# Patient Record
Sex: MALE | Race: BLACK OR AFRICAN AMERICAN | Employment: FULL TIME | URBAN - METROPOLITAN AREA
[De-identification: names, ages, dates, MRNs, and addresses within clinical notes are randomized per-mention and may not be internally consistent; named-entity substitution may affect disease eponyms.]

---

## 2017-07-21 ENCOUNTER — HOSPITAL ENCOUNTER (EMERGENCY)
Age: 27
Discharge: HOME | End: 2017-07-21
Attending: EMERGENCY MEDICINE
Payer: SELF-PAY

## 2017-07-21 VITALS
TEMPERATURE: 99.4 F | BODY MASS INDEX: 28.8 KG/M2 | RESPIRATION RATE: 16 BRPM | HEART RATE: 84 BPM | WEIGHT: 195 LBS | OXYGEN SATURATION: 97 % | DIASTOLIC BLOOD PRESSURE: 99 MMHG | SYSTOLIC BLOOD PRESSURE: 165 MMHG

## 2017-07-21 DIAGNOSIS — T14.8XXA MUSCLE STRAIN: Primary | ICD-10-CM

## 2017-07-21 DIAGNOSIS — M62.830 SPASM OF LUMBAR PARASPINOUS MUSCLE: ICD-10-CM

## 2017-07-21 PROCEDURE — 99281 EMR DPT VST MAYX REQ PHY/QHP: CPT

## 2017-07-21 RX ORDER — CYCLOBENZAPRINE HCL 5 MG
5 TABLET ORAL 2 TIMES DAILY
Qty: 8 TAB | Refills: 0 | Status: SHIPPED | OUTPATIENT
Start: 2017-07-21 | End: 2020-06-24 | Stop reason: CLARIF

## 2017-07-21 RX ORDER — HYDROCODONE BITARTRATE AND ACETAMINOPHEN 5; 325 MG/1; MG/1
1 TABLET ORAL
Qty: 8 TAB | Refills: 0 | Status: SHIPPED | OUTPATIENT
Start: 2017-07-21 | End: 2020-06-24 | Stop reason: CLARIF

## 2017-07-21 RX ORDER — CYCLOBENZAPRINE HCL 5 MG
10 TABLET ORAL 3 TIMES DAILY
Qty: 9 TAB | Refills: 0 | Status: SHIPPED | OUTPATIENT
Start: 2017-07-21 | End: 2017-07-21

## 2017-07-21 NOTE — ED PROVIDER NOTES
HPI Comments: 32yo male presenting to ED for evaluation of left lower back pain after heavy lifting while at work yesterday afternoon. Pt states he was lifting box and pain immediately followed. Denies blunt trauma or other injury. Reports pain in lower back, worse in left lower. Denies radiation to groin or legs. Pt denies history of HIV, unexplained weight loss, unexplained fevers, inability control bowel or bladder, prolonged steroid use, IV drug use, history of cancer. Pt has tried motrin without relief. Reports hx of similar pain after heavy lifting and has had relief with \"percs and muscle relaxant. \"  Pt has hx of HTN, not currently on BP meds. Denies headaches, dizziness, CP, SOB, neck pain, abdominal pain, NVD, urinary symptoms or any other symptoms at this time. Patient is a 32 y.o. male presenting with back pain. The history is provided by the patient. Back Pain    Pertinent negatives include no fever, no numbness, no headaches, no dysuria and no weakness. Past Medical History:   Diagnosis Date    Hypertension        No past surgical history on file. No family history on file. Social History     Social History    Marital status: SINGLE     Spouse name: N/A    Number of children: N/A    Years of education: N/A     Occupational History    Not on file. Social History Main Topics    Smoking status: Former Smoker    Smokeless tobacco: Not on file    Alcohol use Yes      Comment: socially    Drug use: No    Sexual activity: Not on file     Other Topics Concern    Not on file     Social History Narrative         ALLERGIES: Review of patient's allergies indicates no known allergies. Review of Systems   Constitutional: Negative for chills and fever. Cardiovascular: Negative for leg swelling. Genitourinary: Negative for dysuria, flank pain, frequency and hematuria. Musculoskeletal: Positive for back pain and myalgias. Negative for gait problem. Neurological: Negative for dizziness, weakness, numbness and headaches. Psychiatric/Behavioral: Negative. Vitals:    07/21/17 0800   BP: (!) 165/99   Pulse: 84   Resp: 16   Temp: 99.4 °F (37.4 °C)   SpO2: 97%   Weight: 88.5 kg (195 lb)            Physical Exam   Constitutional: He is oriented to person, place, and time. He appears well-developed and well-nourished. No distress. HENT:   Head: Normocephalic and atraumatic. Mouth/Throat: Oropharynx is clear and moist.   Eyes: Conjunctivae are normal.   Neck: Normal range of motion. Cardiovascular: Normal rate, regular rhythm, normal heart sounds and intact distal pulses. Pulmonary/Chest: Effort normal. No respiratory distress. He has no wheezes. He has no rales. Abdominal: Soft. He exhibits no distension. Musculoskeletal: Normal range of motion. Non tender to midline palpation of the cervical, thoracic, and lumbar spine. No step off or deformity. +increased tone appreciated to left parapsinal lumbar muscle, consistent with spasm   Neg SLR. LE reflexes symmetric and intact. Normal sensation, Normal gait. FROM BLE against resistance in flexion and extension with 5/5 strength. Non tender to bilateral hips/knees/ankles. Pulses intact and equal.       Neurological: He is alert and oriented to person, place, and time. He has normal reflexes. No cranial nerve deficit. Skin: Skin is warm. He is not diaphoretic. Nursing note and vitals reviewed. MDM  Number of Diagnoses or Management Options  Diagnosis management comments: 32yo male with lower back pain L>R after heavy lifting at work. BP mildly elevated, not currently on BP meds but will f/u with PCP for monitoring, understands importance. Remaining vitals normal. PE +lumbar lateral muscle tightness, pain consistent with spasms/strain. No back pain red flags, no midline tenderness, normal LE neuro negative exam, no trauma, afebrile. No indication for emergent imaging.   Rx for flexeril and FEW tabs of norco and f/u with [facility] and /or [facility],     ED Course       Procedures

## 2017-07-21 NOTE — LETTER
[address] [place] [address] Dr [facility] EM[facility] 
[address] [facility] [id number] [phone] Work/School Note Date: 7/21/2017 To Whom It May concern: 
 
Ciara Cordova was seen and treated today in the emergency room by the following provider(s): 
Attending Provider: Angelica Sierra MD 
Physician Assistant: Tona Hansen PA-C. Ciara Cordova return to work 7/23/17 Sincerely, Tona Hansen PA-C

## 2017-07-21 NOTE — DISCHARGE INSTRUCTIONS
Back Spasm: Care Instructions  Your Care Instructions  A back spasm is sudden tightness and pain in your back muscles. It may happen from overuse or an injury. Things like sleeping in an awkward way, bending, lifting, standing, or sitting can sometimes cause a spasm. But the cause isn't always clear. Home treatment includes using heat or ice, taking over-the-counter (OTC) pain medicines, and avoiding activities that may cause back pain. For a back spasm that doesn't get better with home care, your doctor may prescribe medicine. Treatments such as massage or manipulation may also help ease a back spasm. Your doctor may also suggest exercise or physical therapy to help improve strength and flexibility in your back muscles. In most cases, getting back to your normal activities is good for your back. Just make sure to avoid doing things that make your pain worse. Follow-up care is a key part of your treatment and safety. Be sure to make and go to all appointments, and call your doctor if you are having problems. It's also a good idea to know your test results and keep a list of the medicines you take. How can you care for yourself at home? Heat, ice, and medicines  · To relieve pain, use heat or ice (whichever feels better) on the affected area. ¨ Put a warm water bottle, a heating pad set on low, or a warm cloth on your back. Put a thin cloth between the heating pad and your skin. Do not go to sleep with a heating pad on your skin. ¨ Try ice or a cold pack on the area for 10 to 20 minutes at a time. Put a thin cloth between the ice and your skin. · Ask your doctor if you can take acetaminophen (such as Tylenol) or nonsteroidal anti-inflammatory drugs, such as ibuprofen or naproxen. Your doctor can prescribe stronger medicines if needed. Be safe with medicines. Read and follow all instructions on the label.   Body positions and posture  · Sit or lie in positions that are most comfortable for you and that reduce pain. Try one of these positions when you lie down:  ¨ Lie on your back with your knees bent and supported by large pillows. ¨ Lie on the floor with your legs on the seat of a sofa or chair. Nia [place] on your side with your knees and hips bent and a pillow between your legs. ¨ Lie on your stomach if it does not make pain worse. · Do not sit up in bed. Avoid soft couches and twisted positions. · Avoid bed rest after the first day of back pain. Bed rest can help relieve pain at first, but it delays healing. Continued rest without activity is usually not good for your back. · If you must sit for long periods of time, take breaks from sitting. Change positions every 30 minutes. Get up and walk around, or lie in a comfortable position. Activity  · Take short walks several times a day. You can start with 5 to 10 minutes, 3 or 4 times a day, and work up to longer walks. Walk on level surfaces and avoid hills and stairs until your back starts to feel better. · After your back spasm starts to feel better, try to stretch your muscles every day, especially before and after exercise and at bedtime. Regular stretching can help relax your muscles. · To prevent future back pain, do exercises to stretch and strengthen your back and stomach. Learn to use good posture, safe lifting techniques, and other ways to move to help you avoid back pain. When should you call for help? Call 911 anytime you think you may need emergency care. For example, call if:  · You are unable to move an arm or a leg at all. Call your doctor now or seek immediate medical care if:  · You have new or worse symptoms in your legs, belly, or buttocks. Symptoms may include:  ¨ Numbness or tingling. ¨ Weakness. ¨ Pain. · You lose bladder or bowel control. Watch closely for changes in your health, and be sure to contact your doctor if:  · You do not get better as expected. Where can you learn more?   Go to http://frantz-sivakumar.info/. Enter E232 in the search box to learn more about \"Back Spasm: Care Instructions. \"  Current as of: March 21, 2017  Content Version: 11.3  © 9751-0594 [employer]. Care instructions adapted under license by [employer] (which disclaims liability or warranty for this information). If you have questions about a medical condition or this instruction, always ask your healthcare professional. [place] [address] any warranty or liability for your use of this information. Back Strain: Care Instructions  Your Care Instructions    Back strain happens when you overstretch, or pull, a muscle in your back. You may hurt your back in an accident or when you exercise or lift something. Most back pain will get better with rest and time. You can take care of yourself at home to help your back heal.  Follow-up care is a key part of your treatment and safety. Be sure to make and go to all appointments, and call your doctor if you are having problems. It's also a good idea to know your test results and keep a list of the medicines you take. How can you care for yourself at home? · Try to stay as active as you can, but stop or reduce any activity that causes pain. · Put ice or a cold pack on the sore muscle for 10 to 20 minutes at a time to stop swelling. Try this every 1 to 2 hours for 3 days (when you are awake) or until the swelling goes down. Put a thin cloth between the ice pack and your skin. · After 2 or 3 days, apply a heating pad on low or a warm cloth to your back. Some doctors suggest that you go back and forth between hot and cold treatments. · Take pain medicines exactly as directed. ¨ If the doctor gave you a prescription medicine for pain, take it as prescribed. ¨ If you are not taking a prescription pain medicine, ask your doctor if you can take an over-the-counter medicine.   · Try sleeping on your side with a pillow between your legs. Or put a pillow under your knees when you lie on your back. These measures can ease pain in your lower back. · Return to your usual level of activity slowly. When should you call for help? Call 911 anytime you think you may need emergency care. For example, call if:  · You are unable to move a leg at all. Call your doctor now or seek immediate medical care if:  · You have new or worse symptoms in your legs, belly, or buttocks. Symptoms may include:  ¨ Numbness or tingling. ¨ Weakness. ¨ Pain. · You lose bladder or bowel control. Watch closely for changes in your health, and be sure to contact your doctor if you are not getting better as expected. Where can you learn more? Go to http://frantz-sivakumar.info/. Enter R398 in the search box to learn more about \"Back Strain: Care Instructions. \"  Current as of: March 21, 2017  Content Version: 11.3  © 8465-1777 [employer] [place]. Care instructions adapted under license by [employer] (which disclaims liability or warranty for this information). If you have questions about a medical condition or this instruction, always ask your healthcare professional. [address] any warranty or liability for your use of this information.

## 2017-07-27 ENCOUNTER — HOSPITAL ENCOUNTER (EMERGENCY)
Age: 27
Discharge: HOME | End: 2017-07-27
Attending: EMERGENCY MEDICINE
Payer: SELF-PAY

## 2017-07-27 VITALS
WEIGHT: 195 LBS | OXYGEN SATURATION: 97 % | DIASTOLIC BLOOD PRESSURE: 100 MMHG | SYSTOLIC BLOOD PRESSURE: 158 MMHG | HEART RATE: 63 BPM | TEMPERATURE: 98.8 F | RESPIRATION RATE: 20 BRPM | BODY MASS INDEX: 28.8 KG/M2

## 2017-07-27 DIAGNOSIS — B37.2 CANDIDAL INTERTRIGO: Primary | ICD-10-CM

## 2017-07-27 DIAGNOSIS — I10 ESSENTIAL HYPERTENSION: ICD-10-CM

## 2017-07-27 PROCEDURE — 99282 EMERGENCY DEPT VISIT SF MDM: CPT

## 2017-07-27 RX ORDER — TRIAMCINOLONE ACETONIDE 0.25 MG/G
CREAM TOPICAL 2 TIMES DAILY
Qty: 15 G | Refills: 0 | Status: SHIPPED | OUTPATIENT
Start: 2017-07-27 | End: 2020-06-24 | Stop reason: CLARIF

## 2017-07-27 RX ORDER — NYSTATIN 100000 U/G
CREAM TOPICAL 2 TIMES DAILY
Qty: 15 G | Refills: 0 | Status: SHIPPED | OUTPATIENT
Start: 2017-07-27 | End: 2020-06-24 | Stop reason: CLARIF

## 2017-07-27 RX ORDER — AMLODIPINE BESYLATE 10 MG/1
10 TABLET ORAL DAILY
Qty: 20 TAB | Refills: 0 | Status: SHIPPED | OUTPATIENT
Start: 2017-07-27 | End: 2017-08-16

## 2017-07-27 NOTE — DISCHARGE INSTRUCTIONS
It is very important that blood pressure medications are taken daily, and that you follow up with a primary care doctor for continued management of medications. Return to ED for any worsening symptoms such as severe headaches, fever, neck stiffness, weakness in the extremities, numbness, dizziness, confusion, difficulty speaking, chest pain, shortness or breath, or vision changes. Yeast Skin Infection: Care Instructions  Your Care Instructions    Yeast normally lives on your skin. Sometimes too much yeast can overgrow in certain areas of the skin and cause an infection. The infection causes red, scaly, moist patches on your skin that may itch. Common areas for skin yeast infections are skin folds under the breasts or belly area. The warm and moist areas in the skin folds can make it easier for yeast to overgrow. Yeast infections also can be found on other parts of the body such as the groin or armpits. You will probably get a cream or ointment that contains an antifungal medicine. Examples of these are miconazole and clotrimazole. You put it on your skin to treat the infection. Your doctor may give you a prescription for the cream or ointment. Or you may be able to buy it without a prescription at most drugstores. If the infection is severe, the doctor will prescribe antifungal pills. A yeast infection usually goes away after about a week of treatment. But it's important to use the medicine for as long as your doctor tells you to. Follow-up care is a key part of your treatment and safety. Be sure to make and go to all appointments, and call your doctor if you are having problems. It's also a good idea to know your test results and keep a list of the medicines you take. How can you care for yourself at home? · Be safe with medicines. Take your medicines exactly as prescribed. Call your doctor if you think you are having a problem with your medicine. · Keep your skin clean and dry.  Your doctor may suggest using powder that contains an antifungal medicine in the skin folds. · Wear loose clothing. When should you call for help? Call your doctor now or seek immediate medical care if:  · You have symptoms of infection, such as:  ¨ Increased pain, swelling, warmth, or redness. ¨ Red streaks leading from the area. ¨ Pus draining from the area. ¨ A fever. Watch closely for changes in your health, and be sure to contact your doctor if:  · You do not get better as expected. Where can you learn more? Go to http://frantz-sivakumar.info/. Enter O220 in the search box to learn more about \"Yeast Skin Infection: Care Instructions. \"  Current as of: November 3, 2016  Content Version: 11.3  © 3266-0718 [employer]. Care instructions adapted under license by [employer] (which disclaims liability or warranty for this information). If you have questions about a medical condition or this instruction, always ask your healthcare professional. [place] [address] any warranty or liability for your use of this information.

## 2017-07-27 NOTE — ED NOTES
Patient also reports uncontrolled hypertension, he admits to being non-compliant for 3 years due to not having insurance.   Patient denies any CP or SOB at this time

## 2017-07-27 NOTE — ED PROVIDER NOTES
HPI Comments: 32yo M c/o rash to groin x 5 months. Itching and irritated, burning sensation. Worse on left thigh. He has not tried any creams for it or medications. He also has high blood pressure and is unable to see a PCP without insurance. Requests refill of his blood pressure medications. Patient is a 32 y.o. male presenting with rash. Rash           Past Medical History:   Diagnosis Date    Hypertension        No past surgical history on file. No family history on file. Social History     Social History    Marital status: SINGLE     Spouse name: N/A    Number of children: N/A    Years of education: N/A     Occupational History    Not on file. Social History Main Topics    Smoking status: Former Smoker    Smokeless tobacco: Not on file    Alcohol use Yes      Comment: socially    Drug use: No    Sexual activity: Not on file     Other Topics Concern    Not on file     Social History Narrative         ALLERGIES: Review of patient's allergies indicates no known allergies. Review of Systems   Constitutional: Negative for fever. HENT: Negative for facial swelling. Eyes: Negative for visual disturbance. Respiratory: Negative for shortness of breath. Cardiovascular: Negative for chest pain. Gastrointestinal: Negative for abdominal pain. Genitourinary: Negative for dysuria. Musculoskeletal: Negative for neck pain. Skin: Positive for rash. Neurological: Negative for dizziness. Psychiatric/Behavioral: Negative for confusion. All other systems reviewed and are negative. Vitals:    07/27/17 0344   BP: (!) 158/100   Pulse: 63   Resp: 20   Temp: 98.8 °F (37.1 °C)   SpO2: 97%   Weight: 88.5 kg (195 lb)            Physical Exam   Constitutional: He appears well-developed and well-nourished. No distress. HENT:   Head: Normocephalic and atraumatic. Eyes: Conjunctivae are normal.   Neck: Normal range of motion. Neck supple. Cardiovascular: Normal rate. Pulmonary/Chest: Effort normal.   Abdominal: Soft. Musculoskeletal: Normal range of motion. Neurological: He is alert. Skin: Skin is warm and dry. Rash noted. He is not diaphoretic. Large patch of hyperpigmented, rough skin with irregular edges to the left medial proximal thigh    Psychiatric: He has a normal mood and affect. Nursing note and vitals reviewed. MDM  Number of Diagnoses or Management Options  Candidal intertrigo: new and does not require workup  Essential hypertension: established and worsening  Diagnosis management comments: Appears to be a candidal rash to the left proximal thigh. Treat with steroids and antifungal.  Blood pressure is elevated today. Recommend low sodium diet and follow up with primary care physician. Return to ER for chest pain or shortness of breath. Referred to free clinic. Discussed treatment plan, return precautions, symptomatic relief, and expected time to improvement. All questions answered. Patient is stable for discharge and outpatient management. ED Course       Procedures      Diagnosis:   1. Candidal intertrigo    2. Essential hypertension          Disposition: home    Follow-up Information     Follow up With Details Comments [address] [facility] Schedule an appointment as soon as possible for a visit  [address] [facility] [address]  [address] to  [address] [address] [facility]    [facility] EMERGENCY DEPT  Immediately if symptoms worsen [address] [address]  [phone]          Discharge Medication List as of 7/27/2017  4:14 AM      START taking these medications    Details   nystatin (MYCOSTATIN) topical cream Apply  to affected area two (2) times a day., Print, Disp-15 g, R-0      triamcinolone acetonide (KENALOG) 0.025 % topical cream Apply  to affected area two (2) times a day.  use thin layer, Print, Disp-15 g, R-0 amLODIPine (NORVASC) 10 mg tablet Take 1 Tab by mouth daily for 20 days. , Print, Disp-20 Tab, R-0         CONTINUE these medications which have NOT CHANGED    Details   !! HYDROcodone-acetaminophen (NORCO) 5-325 mg per tablet Take 1 Tab by mouth every eight (8) hours as needed for Pain. Max Daily Amount: 3 Tabs., Print, Disp-8 Tab, R-0      !! cyclobenzaprine (FLEXERIL) 5 mg tablet Take 1 Tab by mouth two (2) times a day., Print, Disp-8 Tab, R-0      oxyCODONE-acetaminophen (PERCOCET) 5-325 mg per tablet Take 1 tablet every 4-6 hours as needed for pain control. If you were instructed to try over the counter ibuprofen or tylenol, only take the percocet for pain not controlled with the over the counter medication. , Print, Disp-6 Tab, R-0      !! cyclobenzaprine (FLEXERIL) 10 mg tablet Take 0.5 Tabs by mouth three (3) times daily as needed for Muscle Spasm(s). , Print, Disp-10 Tab, R-0      ibuprofen (MOTRIN) 600 mg tablet Take 1 Tab by mouth every six (6) hours as needed for Pain., Print, Disp-20 Tab, R-0      !! HYDROcodone-acetaminophen (NORCO) 5-325 mg per tablet Take 1 Tab by mouth every four (4) hours as needed for Pain. Max Daily Amount: 6 Tabs., Print, Disp-12 Tab, R-0      methocarbamol (ROBAXIN) 500 mg tablet Take 1 Tab by mouth four (4) times daily. , Print, Disp-20 Tab, R-0       !! - Potential duplicate medications found. Please discuss with provider.         Lavell Mcbride PA-C

## 2017-12-26 ENCOUNTER — APPOINTMENT (OUTPATIENT)
Dept: GENERAL RADIOLOGY | Age: 27
End: 2017-12-26
Attending: PHYSICIAN ASSISTANT
Payer: SELF-PAY

## 2017-12-26 ENCOUNTER — HOSPITAL ENCOUNTER (EMERGENCY)
Age: 27
Discharge: HOME | End: 2017-12-26
Attending: EMERGENCY MEDICINE
Payer: SELF-PAY

## 2017-12-26 VITALS
HEIGHT: 69 IN | SYSTOLIC BLOOD PRESSURE: 142 MMHG | TEMPERATURE: 97.1 F | OXYGEN SATURATION: 99 % | BODY MASS INDEX: 28.88 KG/M2 | WEIGHT: 195 LBS | DIASTOLIC BLOOD PRESSURE: 92 MMHG | RESPIRATION RATE: 16 BRPM | HEART RATE: 68 BPM

## 2017-12-26 DIAGNOSIS — F19.10 POLYSUBSTANCE ABUSE (HCC): ICD-10-CM

## 2017-12-26 DIAGNOSIS — K59.00 CONSTIPATION, UNSPECIFIED CONSTIPATION TYPE: Primary | ICD-10-CM

## 2017-12-26 LAB
ALBUMIN SERPL-MCNC: 3.6 G/DL (ref 3.4–5)
ALBUMIN/GLOB SERPL: 0.9 {RATIO} (ref 0.8–1.7)
ALP SERPL-CCNC: 84 U/L (ref 45–117)
ALT SERPL-CCNC: 23 U/L (ref 16–61)
AMPHET UR QL SCN: NEGATIVE
ANION GAP SERPL CALC-SCNC: 8 MMOL/L (ref 3–18)
APPEARANCE UR: CLEAR
AST SERPL-CCNC: 23 U/L (ref 15–37)
BARBITURATES UR QL SCN: NEGATIVE
BASOPHILS # BLD: 0 K/UL (ref 0–0.06)
BASOPHILS NFR BLD: 0 % (ref 0–2)
BENZODIAZ UR QL: NEGATIVE
BILIRUB DIRECT SERPL-MCNC: <0.1 MG/DL (ref 0–0.2)
BILIRUB SERPL-MCNC: 0.3 MG/DL (ref 0.2–1)
BILIRUB UR QL: NEGATIVE
BUN SERPL-MCNC: 10 MG/DL (ref 7–18)
BUN/CREAT SERPL: 8 (ref 12–20)
CALCIUM SERPL-MCNC: 8.6 MG/DL (ref 8.5–10.1)
CANNABINOIDS UR QL SCN: NEGATIVE
CHLORIDE SERPL-SCNC: 104 MMOL/L (ref 100–108)
CO2 SERPL-SCNC: 29 MMOL/L (ref 21–32)
COCAINE UR QL SCN: POSITIVE
COLOR UR: YELLOW
CREAT SERPL-MCNC: 1.31 MG/DL (ref 0.6–1.3)
DIFFERENTIAL METHOD BLD: ABNORMAL
EOSINOPHIL # BLD: 0.2 K/UL (ref 0–0.4)
EOSINOPHIL NFR BLD: 3 % (ref 0–5)
ERYTHROCYTE [DISTWIDTH] IN BLOOD BY AUTOMATED COUNT: 11.8 % (ref 11.6–14.5)
GLOBULIN SER CALC-MCNC: 4 G/DL (ref 2–4)
GLUCOSE SERPL-MCNC: 136 MG/DL (ref 74–99)
GLUCOSE UR STRIP.AUTO-MCNC: NEGATIVE MG/DL
HCT VFR BLD AUTO: 39.3 % (ref 36–48)
HDSCOM,HDSCOM: ABNORMAL
HGB BLD-MCNC: 13.2 G/DL (ref 13–16)
HGB UR QL STRIP: NEGATIVE
KETONES UR QL STRIP.AUTO: NEGATIVE MG/DL
LACTATE BLD-SCNC: 1.7 MMOL/L (ref 0.4–2)
LEUKOCYTE ESTERASE UR QL STRIP.AUTO: NEGATIVE
LIPASE SERPL-CCNC: 235 U/L (ref 73–393)
LYMPHOCYTES # BLD: 2.3 K/UL (ref 0.9–3.6)
LYMPHOCYTES NFR BLD: 40 % (ref 21–52)
MCH RBC QN AUTO: 30 PG (ref 24–34)
MCHC RBC AUTO-ENTMCNC: 33.6 G/DL (ref 31–37)
MCV RBC AUTO: 89.3 FL (ref 74–97)
METHADONE UR QL: NEGATIVE
MONOCYTES # BLD: 0.5 K/UL (ref 0.05–1.2)
MONOCYTES NFR BLD: 9 % (ref 3–10)
NEUTS SEG # BLD: 2.8 K/UL (ref 1.8–8)
NEUTS SEG NFR BLD: 48 % (ref 40–73)
NITRITE UR QL STRIP.AUTO: NEGATIVE
OPIATES UR QL: POSITIVE
PCP UR QL: NEGATIVE
PH UR STRIP: 5.5 [PH] (ref 5–8)
PLATELET # BLD AUTO: 244 K/UL (ref 135–420)
PMV BLD AUTO: 10.5 FL (ref 9.2–11.8)
POTASSIUM SERPL-SCNC: 3.7 MMOL/L (ref 3.5–5.5)
PROT SERPL-MCNC: 7.6 G/DL (ref 6.4–8.2)
PROT UR STRIP-MCNC: NEGATIVE MG/DL
RBC # BLD AUTO: 4.4 M/UL (ref 4.7–5.5)
SODIUM SERPL-SCNC: 141 MMOL/L (ref 136–145)
SP GR UR REFRACTOMETRY: 1.01 (ref 1–1.03)
UROBILINOGEN UR QL STRIP.AUTO: 1 EU/DL (ref 0.2–1)
WBC # BLD AUTO: 5.7 K/UL (ref 4.6–13.2)

## 2017-12-26 PROCEDURE — 81003 URINALYSIS AUTO W/O SCOPE: CPT | Performed by: EMERGENCY MEDICINE

## 2017-12-26 PROCEDURE — 85025 COMPLETE CBC W/AUTO DIFF WBC: CPT | Performed by: PHYSICIAN ASSISTANT

## 2017-12-26 PROCEDURE — 96360 HYDRATION IV INFUSION INIT: CPT

## 2017-12-26 PROCEDURE — 80307 DRUG TEST PRSMV CHEM ANLYZR: CPT | Performed by: PHYSICIAN ASSISTANT

## 2017-12-26 PROCEDURE — 83605 ASSAY OF LACTIC ACID: CPT

## 2017-12-26 PROCEDURE — 80048 BASIC METABOLIC PNL TOTAL CA: CPT | Performed by: PHYSICIAN ASSISTANT

## 2017-12-26 PROCEDURE — 74020 XR ABD (AP AND ERECT OR DECUB): CPT

## 2017-12-26 PROCEDURE — 80076 HEPATIC FUNCTION PANEL: CPT | Performed by: PHYSICIAN ASSISTANT

## 2017-12-26 PROCEDURE — 99284 EMERGENCY DEPT VISIT MOD MDM: CPT

## 2017-12-26 PROCEDURE — 83690 ASSAY OF LIPASE: CPT | Performed by: PHYSICIAN ASSISTANT

## 2017-12-26 PROCEDURE — 74011250636 HC RX REV CODE- 250/636: Performed by: PHYSICIAN ASSISTANT

## 2017-12-26 RX ORDER — MAGNESIUM CITRATE
SOLUTION, ORAL ORAL
Qty: 1 BOTTLE | Refills: 0 | Status: SHIPPED | OUTPATIENT
Start: 2017-12-26 | End: 2020-06-24 | Stop reason: CLARIF

## 2017-12-26 RX ORDER — IPRATROPIUM BROMIDE AND ALBUTEROL SULFATE 2.5; .5 MG/3ML; MG/3ML
3 SOLUTION RESPIRATORY (INHALATION)
Status: DISCONTINUED | OUTPATIENT
Start: 2017-12-26 | End: 2017-12-26

## 2017-12-26 RX ORDER — BISACODYL 5 MG
TABLET, DELAYED RELEASE (ENTERIC COATED) ORAL
Qty: 20 TAB | Refills: 0 | Status: SHIPPED | OUTPATIENT
Start: 2017-12-26 | End: 2020-06-24 | Stop reason: CLARIF

## 2017-12-26 RX ADMIN — SODIUM CHLORIDE 1000 ML: 900 INJECTION, SOLUTION INTRAVENOUS at 07:42

## 2017-12-26 NOTE — DISCHARGE INSTRUCTIONS
Misuse of Multiple Drugs: Care Instructions  Your Care Instructions    You have had treatment to help your body get rid of a combination of any of these drugs:  · Prescription medicines  · Over-the-counter medicines  · Alcohol  · Illegal drugs  Taking some drugs together may cause a bad reaction. They can have unexpected or stronger effects on your body and mind. For example, benzodiazepines (such as alprazolam and lorazepam) and alcohol both depress the nervous system. Taken together, each one is stronger than when it is taken by itself. You are getting better, but it takes time for the drugs to leave your body. It may take up to 2 weeks for your mind to clear and your mood to improve. Depending on the drugs you took, the doctor might have:  · Watched your symptoms or done tests to find out what drugs were in your body. · Treated you to control your breathing, blood pressure, and heart rate. · Tried to remove the drugs from your body by pumping your stomach or giving you a substance by mouth that absorbs chemicals. · Given you a substance that neutralizes chemicals (antidote). · Given you oxygen to help you breathe. · Given you fluids. The doctor also watched you carefully to make sure you were recovering safely. Follow-up care is a key part of your treatment and safety. Be sure to make and go to all appointments, and call your doctor if you are having problems. It's also a good idea to know your test results and keep a list of the medicines you take. How can you care for yourself at home? · When you take substances like alcohol and some drugs regularly, your body gets used to them. This is called dependency. If you are dependent on them, you may have withdrawal symptoms when you stop taking them. These symptoms may include trembling, feeling restless, and sweating. To help get past these:  ¨ Get plenty of rest.  ¨ Drink lots of fluids. ¨ Stay active. ¨ Eat a healthy diet.   · If you had a tube in your throat to help you breathe, you may have a sore throat or hoarseness that can last a few days. Drinking fluids may help soothe your throat. Get help to stop using drugs. Talk to your doctor about drug and alcohol treatment programs. When should you call for help? Call 911 anytime you think you may need emergency care. For example, call if:  ? · You feel you cannot stop from hurting yourself or someone else. ?Call your doctor now or seek immediate medical care if:  ? · You have new or worse symptoms of withdrawal, such as trembling, feeling restless, and sweating, that you can't manage at home. ? Watch closely for changes in your health, and be sure to contact your doctor if:  ? · You do not get better as expected. ? · You need help finding the right place to get help with drug or alcohol problems. Where can you learn more? Go to http://frantz-sivakumar.info/. Enter B109 in the search box to learn more about \"Misuse of Multiple Drugs: Care Instructions. \"  Current as of: November 3, 2016  Content Version: 11.4  © 8307-7154 [employer]. Care instructions adapted under license by [employer] (which disclaims liability or warranty for this information). If you have questions about a medical condition or this instruction, always ask your healthcare professional. [place] [address] any warranty or liability for your use of this information. Learning About Drug Misuse  What is drug misuse? Drug misuse means using drugs in a way that harms you or causes you to harm others. Your doctor may call it substance use disorder or drug abuse. It's possible to misuse almost any type of drug, including illegal drugs, prescription drugs, and over-the-counter drugs. An overdose happens when a person takes more than the normal or recommended amount of a drug. An overdose can result in harmful symptoms or death. Could you have a problem?   If there's a chance you may be misusing drugs, it's important to find out. So ask yourself a few questions. · Do you spend a lot of time thinking about your medicine or other drug-getting it and using it? Has that taken the place of other things you used to enjoy? · Have you had problems with your family or friends, or at work, because of your use? · Do you use drugs even when you've told yourself you won't? Do you think you might have a problem? If you do, then you've just taken an important first step. Many people have overcome this problem. And most of them started by reaching out to others, like caring friends or family, their doctor, or a support group. How is drug misuse treated? If you think you may have a problem with drugs, talk to your doctor. You and your doctor can decide whether you have a problem and what type of treatment might help you. You may need to stay in a hospital at first, so that you can be treated for withdrawal symptoms. One of the goals of treatment is helping you get used to life without the drug. Counseling can help you prepare for people or situations that might tempt you to start using again. You can practice these skills through one-on-one counseling, family therapy, or group therapy. Therapy may be part of inpatient treatment, where you stay in a treatment center. Or it may be part of outpatient treatment, where you can fit your therapy around your job or other responsibilities. Another goal of treatment is getting ongoing support for your drug-free life. Many people find support by going to meetings like Narcotics Anonymous or [employer]. This type of support can help you feel less alone and more motivated to stay drug-free. You might talk to your doctor or do an online search for local treatment programs. Or you might tell a friend or loved one that you need help. Follow-up care is a key part of your treatment and safety.  Be sure to make and go to all appointments, and call your doctor if you are having problems. It's also a good idea to know your test results and keep a list of the medicines you take. Where can you learn more? Go to http://frantz-sivakumar.info/. Enter [phone] in the search box to learn more about \"Learning About Drug Misuse. \"  Current as of: November 3, 2016  Content Version: 11.4  © 0349-2048 [employer]. Care instructions adapted under license by [employer] (which disclaims liability or warranty for this information). If you have questions about a medical condition or this instruction, always ask your healthcare professional. [place] [address] any warranty or liability for your use of this information. Constipation: Care Instructions  Your Care Instructions    Constipation means that you have a hard time passing stools (bowel movements). People pass stools from 3 times a day to once every 3 days. What is normal for you may be different. Constipation may occur with pain in the rectum and cramping. The pain may get worse when you try to pass stools. Sometimes there are small amounts of bright red blood on toilet paper or the surface of stools. This is because of enlarged veins near the rectum (hemorrhoids). A few changes in your diet and lifestyle may help you avoid ongoing constipation. Your doctor may also prescribe medicine to help loosen your stool. Some medicines can cause constipation. These include pain medicines and antidepressants. Tell your doctor about all the medicines you take. Your doctor may want to make a medicine change to ease your symptoms. Follow-up care is a key part of your treatment and safety. Be sure to make and go to all appointments, and call your doctor if you are having problems. It's also a good idea to know your test results and keep a list of the medicines you take. How can you care for yourself at home?   · Drink plenty of fluids, enough so that your urine is light yellow or clear like water. If you have kidney, heart, or liver disease and have to limit fluids, talk with your doctor before you increase the amount of fluids you drink. · Include high-fiber foods in your diet each day. These include fruits, vegetables, beans, and whole grains. · Get at least 30 minutes of exercise on most days of the week. Walking is a good choice. You also may want to do other activities, such as running, swimming, cycling, or playing tennis or team sports. · Take a fiber supplement, such as Citrucel or Metamucil, every day. Read and follow all instructions on the label. · Schedule time each day for a bowel movement. A daily routine may help. Take your time having your bowel movement. · Support your feet with a small step stool when you sit on the toilet. This helps flex your hips and places your pelvis in a squatting position. · Your doctor may recommend an over-the-counter laxative to relieve your constipation. Examples are Milk of Magnesia and MiraLax. Read and follow all instructions on the label. Do not use laxatives on a long-term basis. When should you call for help? Call your doctor now or seek immediate medical care if:  ? · You have new or worse belly pain. ? · You have new or worse nausea or vomiting. ? · You have blood in your stools. ? Watch closely for changes in your health, and be sure to contact your doctor if:  ? · Your constipation is getting worse. ? · You do not get better as expected. Where can you learn more? Go to http://frantz-sivakumar.info/. Enter 21 [phone] in the search box to learn more about \"Constipation: Care Instructions. \"  Current as of: March 20, 2017  Content Version: 11.4  © 8659-5886 [employer]. Care instructions adapted under license by [employer] (which disclaims liability or warranty for this information).  If you have questions about a medical condition or this instruction, always ask your healthcare professional. [address] any warranty or liability for your use of this information.

## 2017-12-26 NOTE — ED NOTES
Pt in ED on stretcher with c/o tightness in stomach, with increased gas(burping and flatulence) pt stated last BM was Friday per patient it was very small, pt denies vomiting, or  blood in stool, pt states has had a poor appetite. Pt denies ETOH use, admits to using some Heroin last night.

## 2017-12-26 NOTE — ED PROVIDER NOTES
Patient is a 32 y.o. male presenting with abdominal pain. The history is provided by the patient. Abdominal Pain    This is a new problem. The current episode started 2 days ago. The problem occurs constantly. The problem has not changed since onset. The pain is located in the LLQ and epigastric region. The quality of the pain is cramping and sharp. The pain is moderate. Associated symptoms include belching, flatus and nausea. Pertinent negatives include no anorexia, no fever, no diarrhea, no hematochezia, no melena, no vomiting, no constipation, no dysuria, no frequency, no hematuria, no headaches, no arthralgias, no myalgias, no chest pain, no testicular pain and no back pain. Nothing worsens the pain. The pain is relieved by nothing. Past workup includes no CT scan, no ultrasound, no surgery, no esophagogastroduodenoscopy, no UGI, no colonoscopy and no barium enema. The patient's surgical history non-contributory. Past Medical History:   Diagnosis Date    Hypertension        No past surgical history on file. No family history on file. Social History     Social History    Marital status: SINGLE     Spouse name: N/A    Number of children: N/A    Years of education: N/A     Occupational History    Not on file. Social History Main Topics    Smoking status: Former Smoker    Smokeless tobacco: Not on file    Alcohol use Yes      Comment: socially    Drug use: No    Sexual activity: Not on file     Other Topics Concern    Not on file     Social History Narrative         ALLERGIES: Review of patient's allergies indicates no known allergies. Review of Systems   Constitutional: Negative for chills and fever. Cardiovascular: Negative for chest pain. Gastrointestinal: Positive for abdominal pain, flatus and nausea. Negative for anorexia, constipation, diarrhea, hematochezia, melena and vomiting. Genitourinary: Negative for dysuria, frequency, hematuria and testicular pain. Musculoskeletal: Negative for arthralgias, back pain and myalgias. Neurological: Negative for headaches. All other systems reviewed and are negative. Vitals:    12/26/17 0705   BP: (!) 152/93   Pulse: 65   Resp: 18   Temp: 98.2 °F (36.8 °C)   SpO2: 97%   Weight: 88.5 kg (195 lb)   Height: 5' 9\" (1.753 m)            Physical Exam   Constitutional: He is oriented to person, place, and time. He appears well-developed and well-nourished. HENT:   Head: Normocephalic and atraumatic. Mouth/Throat: Oropharynx is clear and moist.   Eyes: Conjunctivae are normal.   Neck: Normal range of motion. Cardiovascular: Normal rate, regular rhythm and normal heart sounds. Pulmonary/Chest: Effort normal. No respiratory distress. He has no wheezes. He has no rales. Abdominal: Soft. Bowel sounds are normal. He exhibits no distension and no mass. There is tenderness. There is no rebound and no guarding. Neurological: He is oriented to person, place, and time. Nursing note and vitals reviewed.        MDM  Number of Diagnoses or Management Options  Constipation, unspecified constipation type:   Polysubstance abuse:      Amount and/or Complexity of Data Reviewed  Clinical lab tests: reviewed and ordered  Tests in the radiology section of CPT®: ordered and reviewed      ED Course       Procedures           Medications ordered:   Medications   sodium chloride 0.9 % bolus infusion 1,000 mL (1,000 mL IntraVENous New Bag 12/26/17 0742)         Lab findings:  Recent Results (from the past 12 hour(s))   URINALYSIS W/ RFLX MICROSCOPIC    Collection Time: 12/26/17  6:51 AM   Result Value Ref Range    Color YELLOW      Appearance CLEAR      Specific gravity 1.014 1.005 - 1.030      pH (UA) 5.5 5.0 - 8.0      Protein NEGATIVE  NEG mg/dL    Glucose NEGATIVE  NEG mg/dL    Ketone NEGATIVE  NEG mg/dL    Bilirubin NEGATIVE  NEG      Blood NEGATIVE  NEG      Urobilinogen 1.0 0.2 - 1.0 EU/dL    Nitrites NEGATIVE  NEG      Leukocyte Esterase NEGATIVE  NEG     DRUG SCREEN, URINE    Collection Time: 12/26/17  6:51 AM   Result Value Ref Range    BENZODIAZEPINES NEGATIVE  NEG      BARBITURATES NEGATIVE  NEG      THC (TH-CANNABINOL) NEGATIVE  NEG      OPIATES POSITIVE (A) NEG      PCP(PHENCYCLIDINE) NEGATIVE  NEG      COCAINE POSITIVE (A) NEG      AMPHETAMINES NEGATIVE  NEG      METHADONE NEGATIVE  NEG      HDSCOM (NOTE)    CBC WITH AUTOMATED DIFF    Collection Time: 12/26/17  7:29 AM   Result Value Ref Range    WBC 5.7 4.6 - 13.2 K/uL    RBC 4.40 (L) 4.70 - 5.50 M/uL    HGB 13.2 13.0 - 16.0 g/dL    HCT 39.3 36.0 - 48.0 %    MCV 89.3 74.0 - 97.0 FL    MCH 30.0 24.0 - 34.0 PG    MCHC 33.6 31.0 - 37.0 g/dL    RDW 11.8 11.6 - 14.5 %    PLATELET 909 909 - 115 K/uL    MPV 10.5 9.2 - 11.8 FL    NEUTROPHILS 48 40 - 73 %    LYMPHOCYTES 40 21 - 52 %    MONOCYTES 9 3 - 10 %    EOSINOPHILS 3 0 - 5 %    BASOPHILS 0 0 - 2 %    ABS. NEUTROPHILS 2.8 1.8 - 8.0 K/UL    ABS. LYMPHOCYTES 2.3 0.9 - 3.6 K/UL    ABS. MONOCYTES 0.5 0.05 - 1.2 K/UL    ABS. EOSINOPHILS 0.2 0.0 - 0.4 K/UL    ABS.  BASOPHILS 0.0 0.0 - 0.06 K/UL    DF AUTOMATED     METABOLIC PANEL, BASIC    Collection Time: 12/26/17  7:29 AM   Result Value Ref Range    Sodium 141 136 - 145 mmol/L    Potassium 3.7 3.5 - 5.5 mmol/L    Chloride 104 100 - 108 mmol/L    CO2 29 21 - 32 mmol/L    Anion gap 8 3.0 - 18 mmol/L    Glucose 136 (H) 74 - 99 mg/dL    BUN 10 7.0 - 18 MG/DL    Creatinine 1.31 (H) 0.6 - 1.3 MG/DL    BUN/Creatinine ratio 8 (L) 12 - 20      GFR est AA >60 >60 ml/min/1.73m2    GFR est non-AA >60 >60 ml/min/1.73m2    Calcium 8.6 8.5 - 10.1 MG/DL   LIPASE    Collection Time: 12/26/17  7:29 AM   Result Value Ref Range    Lipase 235 73 - 393 U/L   HEPATIC FUNCTION PANEL    Collection Time: 12/26/17  7:29 AM   Result Value Ref Range    Protein, total 7.6 6.4 - 8.2 g/dL    Albumin 3.6 3.4 - 5.0 g/dL    Globulin 4.0 2.0 - 4.0 g/dL    A-G Ratio 0.9 0.8 - 1.7      Bilirubin, total 0.3 0.2 - 1.0 MG/DL Bilirubin, direct <0.1 0.0 - 0.2 MG/DL    Alk. phosphatase 84 45 - 117 U/L    AST (SGOT) 23 15 - 37 U/L    ALT (SGPT) 23 16 - 61 U/L   POC LACTIC ACID    Collection Time: 12/26/17  7:37 AM   Result Value Ref Range    Lactic Acid (POC) 1.7 0.4 - 2.0 mmol/L            X-Ray, CT or other radiology findings or impressions:  Xr Abd (ap And Erect Or Decub)    Result Date: 12/26/2017  INDICATION: Abdominal pain. COMPARISON: 8/9/2014. FINDINGS: Supine radiograph demonstrates marked diffusely retained fecal material noted throughout the visualized colon. No pathologic small bowel obstruction. No free intraperitoneal air. Osseous structures are intact. IMPRESSION: Marked retained fecal material. No free intraperitoneal air or small bowel obstruction. Progress notes, Consult notes or additional Procedure notes:   Pt sleeping. Pain improved, discussed with him all results. Will give Rx for stool softeners and f/u instructions. BP mildly elvated otherwise VSS. Labs reviewed and rearussuring. No indication for further ED imaging or work up. No obstruction on KUB, +stool burden. Dispo:  Patient was d/c in stable condition. Return to the ER if you are unable to obtain referral as directed.        patient's  results have been reviewed with her.  he has been counseled regarding her diagnosis, treatment, and plan. he verbally conveys understanding and agreement of the signs, symptoms, diagnosis, treatment and prognosis and additionally agrees to follow up as discussed. he also agrees with the care-plan and conveys that all of her questions have been answered. I have also provided discharge instructions for her that include: educational information regarding their diagnosis and treatment, and list of reasons why they would want to return to the ED prior to their follow-up appointment, should her condition change. Tona Gerard PA-C    Diagnosis:   1. Constipation, unspecified constipation type    2. Polysubstance abuse        Follow-up Information     Follow up With Details Comments [address] [facility]   [address] [facility] [address]  [phone]    [facility]  As needed, If symptoms worsen [address] [facility] [address]  [phone]           Patient's Medications   Start Taking    BISACODYL (DULCOLAX, BISACODYL,) 5 MG EC TABLET    Take 1 tablet by mouth twice a day as needed for constipation. MAGNESIUM CITRATE SOLUTION    Take 1/3 of bottle every 8 hours until finished or until you have a bowel movement. Continue Taking    CYCLOBENZAPRINE (FLEXERIL) 10 MG TABLET    Take 0.5 Tabs by mouth three (3) times daily as needed for Muscle Spasm(s). CYCLOBENZAPRINE (FLEXERIL) 5 MG TABLET    Take 1 Tab by mouth two (2) times a day. HYDROCODONE-ACETAMINOPHEN (NORCO) 5-325 MG PER TABLET    Take 1 Tab by mouth every four (4) hours as needed for Pain. Max Daily Amount: 6 Tabs. HYDROCODONE-ACETAMINOPHEN (NORCO) 5-325 MG PER TABLET    Take 1 Tab by mouth every eight (8) hours as needed for Pain. Max Daily Amount: 3 Tabs. IBUPROFEN (MOTRIN) 600 MG TABLET    Take 1 Tab by mouth every six (6) hours as needed for Pain. METHOCARBAMOL (ROBAXIN) 500 MG TABLET    Take 1 Tab by mouth four (4) times daily. NYSTATIN (MYCOSTATIN) TOPICAL CREAM    Apply  to affected area two (2) times a day. OXYCODONE-ACETAMINOPHEN (PERCOCET) 5-325 MG PER TABLET    Take 1 tablet every 4-6 hours as needed for pain control. If you were instructed to try over the counter ibuprofen or tylenol, only take the percocet for pain not controlled with the over the counter medication. TRIAMCINOLONE ACETONIDE (KENALOG) 0.025 % TOPICAL CREAM    Apply  to affected area two (2) times a day.  use thin layer   These Medications have changed    No medications on file   Stop Taking    No medications on file

## 2018-01-31 ENCOUNTER — HOSPITAL ENCOUNTER (INPATIENT)
Age: 28
LOS: 1 days | Discharge: LEFT BEFORE DISCHARGE | DRG: 316 | End: 2018-01-31
Attending: EMERGENCY MEDICINE | Admitting: HOSPITALIST
Payer: SELF-PAY

## 2018-01-31 ENCOUNTER — APPOINTMENT (OUTPATIENT)
Dept: GENERAL RADIOLOGY | Age: 28
DRG: 316 | End: 2018-01-31
Attending: NURSE PRACTITIONER
Payer: SELF-PAY

## 2018-01-31 VITALS
BODY MASS INDEX: 30.36 KG/M2 | TEMPERATURE: 98.6 F | HEIGHT: 69 IN | RESPIRATION RATE: 10 BRPM | DIASTOLIC BLOOD PRESSURE: 101 MMHG | SYSTOLIC BLOOD PRESSURE: 154 MMHG | HEART RATE: 69 BPM | OXYGEN SATURATION: 100 % | WEIGHT: 205 LBS

## 2018-01-31 DIAGNOSIS — I10 UNCONTROLLED HYPERTENSION: ICD-10-CM

## 2018-01-31 DIAGNOSIS — F14.10 COCAINE ABUSE (HCC): ICD-10-CM

## 2018-01-31 DIAGNOSIS — R94.31 T WAVE INVERSION IN EKG: ICD-10-CM

## 2018-01-31 DIAGNOSIS — R07.9 CHEST PAIN, UNSPECIFIED TYPE: Primary | ICD-10-CM

## 2018-01-31 PROBLEM — I42.9 CARDIOMYOPATHY (HCC): Status: ACTIVE | Noted: 2018-01-31

## 2018-01-31 LAB
ALBUMIN SERPL-MCNC: 4.3 G/DL (ref 3.4–5)
ALBUMIN/GLOB SERPL: 1 {RATIO} (ref 0.8–1.7)
ALP SERPL-CCNC: 85 U/L (ref 45–117)
ALT SERPL-CCNC: 30 U/L (ref 16–61)
AMPHET UR QL SCN: NEGATIVE
ANION GAP SERPL CALC-SCNC: 6 MMOL/L (ref 3–18)
APTT PPP: 30.5 SEC (ref 23–36.4)
AST SERPL-CCNC: 20 U/L (ref 15–37)
BARBITURATES UR QL SCN: NEGATIVE
BASOPHILS # BLD: 0 K/UL (ref 0–0.06)
BASOPHILS NFR BLD: 0 % (ref 0–2)
BENZODIAZ UR QL: NEGATIVE
BILIRUB SERPL-MCNC: 0.8 MG/DL (ref 0.2–1)
BUN SERPL-MCNC: 14 MG/DL (ref 7–18)
BUN/CREAT SERPL: 11 (ref 12–20)
CALCIUM SERPL-MCNC: 9 MG/DL (ref 8.5–10.1)
CANNABINOIDS UR QL SCN: NEGATIVE
CHLORIDE SERPL-SCNC: 104 MMOL/L (ref 100–108)
CK MB CFR SERPL CALC: 0.4 % (ref 0–4)
CK MB SERPL-MCNC: 1.4 NG/ML (ref 5–25)
CK SERPL-CCNC: 343 U/L (ref 39–308)
CO2 SERPL-SCNC: 29 MMOL/L (ref 21–32)
COCAINE UR QL SCN: POSITIVE
CREAT SERPL-MCNC: 1.29 MG/DL (ref 0.6–1.3)
DIFFERENTIAL METHOD BLD: NORMAL
EOSINOPHIL # BLD: 0.1 K/UL (ref 0–0.4)
EOSINOPHIL NFR BLD: 2 % (ref 0–5)
ERYTHROCYTE [DISTWIDTH] IN BLOOD BY AUTOMATED COUNT: 12.2 % (ref 11.6–14.5)
GLOBULIN SER CALC-MCNC: 4.5 G/DL (ref 2–4)
GLUCOSE SERPL-MCNC: 95 MG/DL (ref 74–99)
HCT VFR BLD AUTO: 43.5 % (ref 36–48)
HDSCOM,HDSCOM: ABNORMAL
HGB BLD-MCNC: 15 G/DL (ref 13–16)
INR PPP: 1 (ref 0.8–1.2)
LYMPHOCYTES # BLD: 1.6 K/UL (ref 0.9–3.6)
LYMPHOCYTES NFR BLD: 30 % (ref 21–52)
MCH RBC QN AUTO: 30.7 PG (ref 24–34)
MCHC RBC AUTO-ENTMCNC: 34.5 G/DL (ref 31–37)
MCV RBC AUTO: 89.1 FL (ref 74–97)
METHADONE UR QL: NEGATIVE
MONOCYTES # BLD: 0.3 K/UL (ref 0.05–1.2)
MONOCYTES NFR BLD: 6 % (ref 3–10)
NEUTS SEG # BLD: 3.3 K/UL (ref 1.8–8)
NEUTS SEG NFR BLD: 62 % (ref 40–73)
OPIATES UR QL: POSITIVE
PCP UR QL: NEGATIVE
PLATELET # BLD AUTO: 265 K/UL (ref 135–420)
PMV BLD AUTO: 9.9 FL (ref 9.2–11.8)
POTASSIUM SERPL-SCNC: 3.7 MMOL/L (ref 3.5–5.5)
PROT SERPL-MCNC: 8.8 G/DL (ref 6.4–8.2)
PROTHROMBIN TIME: 12.9 SEC (ref 11.5–15.2)
RBC # BLD AUTO: 4.88 M/UL (ref 4.7–5.5)
SODIUM SERPL-SCNC: 139 MMOL/L (ref 136–145)
TROPONIN I BLD-MCNC: <0.04 NG/ML (ref 0–0.08)
TROPONIN I SERPL-MCNC: <0.02 NG/ML (ref 0–0.04)
WBC # BLD AUTO: 5.4 K/UL (ref 4.6–13.2)

## 2018-01-31 PROCEDURE — 96365 THER/PROPH/DIAG IV INF INIT: CPT

## 2018-01-31 PROCEDURE — 74011250637 HC RX REV CODE- 250/637: Performed by: HOSPITALIST

## 2018-01-31 PROCEDURE — 85730 THROMBOPLASTIN TIME PARTIAL: CPT | Performed by: NURSE PRACTITIONER

## 2018-01-31 PROCEDURE — 82550 ASSAY OF CK (CPK): CPT | Performed by: NURSE PRACTITIONER

## 2018-01-31 PROCEDURE — 74011250637 HC RX REV CODE- 250/637: Performed by: NURSE PRACTITIONER

## 2018-01-31 PROCEDURE — 85610 PROTHROMBIN TIME: CPT | Performed by: NURSE PRACTITIONER

## 2018-01-31 PROCEDURE — 65610000006 HC RM INTENSIVE CARE

## 2018-01-31 PROCEDURE — 93005 ELECTROCARDIOGRAM TRACING: CPT

## 2018-01-31 PROCEDURE — 99285 EMERGENCY DEPT VISIT HI MDM: CPT

## 2018-01-31 PROCEDURE — 80307 DRUG TEST PRSMV CHEM ANLYZR: CPT | Performed by: NURSE PRACTITIONER

## 2018-01-31 PROCEDURE — 96366 THER/PROPH/DIAG IV INF ADDON: CPT

## 2018-01-31 PROCEDURE — 74011250636 HC RX REV CODE- 250/636: Performed by: NURSE PRACTITIONER

## 2018-01-31 PROCEDURE — 93306 TTE W/DOPPLER COMPLETE: CPT

## 2018-01-31 PROCEDURE — 84484 ASSAY OF TROPONIN QUANT: CPT | Performed by: NURSE PRACTITIONER

## 2018-01-31 PROCEDURE — 85025 COMPLETE CBC W/AUTO DIFF WBC: CPT | Performed by: NURSE PRACTITIONER

## 2018-01-31 PROCEDURE — 74011250637 HC RX REV CODE- 250/637: Performed by: EMERGENCY MEDICINE

## 2018-01-31 PROCEDURE — 96367 TX/PROPH/DG ADDL SEQ IV INF: CPT

## 2018-01-31 PROCEDURE — 80053 COMPREHEN METABOLIC PANEL: CPT | Performed by: NURSE PRACTITIONER

## 2018-01-31 PROCEDURE — 74011000250 HC RX REV CODE- 250: Performed by: EMERGENCY MEDICINE

## 2018-01-31 PROCEDURE — 71046 X-RAY EXAM CHEST 2 VIEWS: CPT

## 2018-01-31 RX ORDER — HYDROCODONE BITARTRATE AND ACETAMINOPHEN 5; 325 MG/1; MG/1
1 TABLET ORAL
Status: DISCONTINUED | OUTPATIENT
Start: 2018-01-31 | End: 2018-01-31 | Stop reason: HOSPADM

## 2018-01-31 RX ORDER — CYCLOBENZAPRINE HCL 10 MG
5 TABLET ORAL
Status: DISCONTINUED | OUTPATIENT
Start: 2018-01-31 | End: 2018-01-31 | Stop reason: HOSPADM

## 2018-01-31 RX ORDER — GUAIFENESIN 100 MG/5ML
81 LIQUID (ML) ORAL DAILY
Status: DISCONTINUED | OUTPATIENT
Start: 2018-02-01 | End: 2018-01-31 | Stop reason: HOSPADM

## 2018-01-31 RX ORDER — OXYCODONE AND ACETAMINOPHEN 5; 325 MG/1; MG/1
1 TABLET ORAL
Status: DISCONTINUED | OUTPATIENT
Start: 2018-01-31 | End: 2018-01-31 | Stop reason: HOSPADM

## 2018-01-31 RX ORDER — HEPARIN SODIUM 10000 [USP'U]/100ML
10.75-25 INJECTION, SOLUTION INTRAVENOUS
Status: DISCONTINUED | OUTPATIENT
Start: 2018-01-31 | End: 2018-01-31 | Stop reason: HOSPADM

## 2018-01-31 RX ORDER — GUAIFENESIN 100 MG/5ML
81 LIQUID (ML) ORAL
Status: DISCONTINUED | OUTPATIENT
Start: 2018-01-31 | End: 2018-01-31

## 2018-01-31 RX ORDER — METHOCARBAMOL 500 MG/1
500 TABLET, FILM COATED ORAL 4 TIMES DAILY
Status: DISCONTINUED | OUTPATIENT
Start: 2018-01-31 | End: 2018-01-31 | Stop reason: HOSPADM

## 2018-01-31 RX ORDER — GUAIFENESIN 100 MG/5ML
325 LIQUID (ML) ORAL
Status: COMPLETED | OUTPATIENT
Start: 2018-01-31 | End: 2018-01-31

## 2018-01-31 RX ORDER — IBUPROFEN 600 MG/1
600 TABLET ORAL
Status: DISCONTINUED | OUTPATIENT
Start: 2018-01-31 | End: 2018-01-31 | Stop reason: HOSPADM

## 2018-01-31 RX ORDER — NITROGLYCERIN 40 MG/100ML
0-20 INJECTION INTRAVENOUS
Status: DISCONTINUED | OUTPATIENT
Start: 2018-01-31 | End: 2018-01-31 | Stop reason: HOSPADM

## 2018-01-31 RX ORDER — NITROGLYCERIN 0.4 MG/1
0.4 TABLET SUBLINGUAL
Status: DISCONTINUED | OUTPATIENT
Start: 2018-01-31 | End: 2018-01-31

## 2018-01-31 RX ORDER — BISACODYL 5 MG
5 TABLET, DELAYED RELEASE (ENTERIC COATED) ORAL DAILY PRN
Status: DISCONTINUED | OUTPATIENT
Start: 2018-01-31 | End: 2018-01-31 | Stop reason: HOSPADM

## 2018-01-31 RX ORDER — CLONIDINE HYDROCHLORIDE 0.1 MG/1
0.1 TABLET ORAL
Status: COMPLETED | OUTPATIENT
Start: 2018-01-31 | End: 2018-01-31

## 2018-01-31 RX ORDER — CYCLOBENZAPRINE HCL 10 MG
5 TABLET ORAL 2 TIMES DAILY
Status: DISCONTINUED | OUTPATIENT
Start: 2018-01-31 | End: 2018-01-31 | Stop reason: HOSPADM

## 2018-01-31 RX ORDER — HEPARIN SODIUM 1000 [USP'U]/ML
43 INJECTION, SOLUTION INTRAVENOUS; SUBCUTANEOUS ONCE
Status: COMPLETED | OUTPATIENT
Start: 2018-01-31 | End: 2018-01-31

## 2018-01-31 RX ORDER — TRIAMCINOLONE ACETONIDE 0.25 MG/G
CREAM TOPICAL 2 TIMES DAILY
Status: DISCONTINUED | OUTPATIENT
Start: 2018-01-31 | End: 2018-01-31 | Stop reason: HOSPADM

## 2018-01-31 RX ADMIN — ASPIRIN 81 MG 324 MG: 81 TABLET ORAL at 11:08

## 2018-01-31 RX ADMIN — HEPARIN SODIUM 4000 UNITS: 1000 INJECTION, SOLUTION INTRAVENOUS; SUBCUTANEOUS at 11:14

## 2018-01-31 RX ADMIN — METHOCARBAMOL 500 MG: 500 TABLET ORAL at 15:24

## 2018-01-31 RX ADMIN — NITROGLYCERIN 10 MCG/MIN: 40 INJECTION INTRAVENOUS at 11:13

## 2018-01-31 RX ADMIN — CLONIDINE HYDROCHLORIDE 0.1 MG: 0.1 TABLET ORAL at 15:23

## 2018-01-31 RX ADMIN — HEPARIN SODIUM AND DEXTROSE 1000 UNITS/HR: 10000; 5 INJECTION INTRAVENOUS at 11:15

## 2018-01-31 NOTE — DISCHARGE SUMMARY
Physician Discharge Summary       Patient: Nelia Woodall MRN: [id number]  SSN: [id number]    YOB: 1990  Age: 32 y.o. Sex: male    PCP: None    Allergies: Review of patient's allergies indicates no known allergies. Admit date: 1/31/2018  Admitting Provider: Angela Craft MD    Discharge date: 1/31/2018  Discharging Provider: Angela Craft MD    * Admission Diagnoses: Chest pain  Abnormal EKG  T wave inversion in EKG  Uncontrolled hypertension  Chest pain  Cardiomyopathy [facility])    * Discharge Diagnoses:    Hospital Problems as of 1/31/2018  Date Reviewed: 1/31/2018          Codes Class Noted - Resolved POA    Chest pain ICD-10-CM: R07.9  ICD-9-CM: 786.50  1/31/2018 - Present Yes        Abnormal EKG ICD-10-CM: R94.31  ICD-9-CM: 794.31  1/31/2018 - Present Yes        Uncontrolled hypertension ICD-10-CM: I10  ICD-9-CM: 401.9  1/31/2018 - Present Yes        T wave inversion in EKG ICD-10-CM: R94.31  ICD-9-CM: 794.31  1/31/2018 - Present Yes        Cardiomyopathy ([address] ICD-10-CM: I42.9  ICD-9-CM: 425.4  1/31/2018 - Present Yes              * Hospital Course: The patient was admitted to the ICU for acute cocaine induced cardiomyopathy. 3 hours following my admission the patient elected to leave AMA . I discussed the risks of leaving including but not limited to death and he still decided to leave. * Procedures: none  * No surgery found *      Consults: Cardiology    Significant Diagnostic Studies: none      * Discharge Condition: critical  * Disposition: AMA    Discharge Medications:  Current Discharge Medication List          * Follow-up Care/Patient Instructions:   Activity: Activity as tolerated  Diet: Cardiac Diet  Wound Care: None needed    Follow-up Information     None          Signed:  Angela Craft MD  1/31/2018  3:39 PM

## 2018-01-31 NOTE — ED NOTES
Pt requesting to leave. Dr. Radha Rebolledo informed and is at the bedside to discuss request with pt.

## 2018-01-31 NOTE — ED NOTES
Pt requesting to leave ED. Dr. Shanna Robles made aware. Pt reports that he is afraid that he will go into withdrawal from heroin and does not want to stay.

## 2018-01-31 NOTE — Clinical Note
Status[de-identified] Inpatient [101] Type of Bed: Intensive Care [6] Inpatient Hospitalization Certified Necessary for the Following Reasons: 4. Patient requires ICU level of care interventions (further clarification in H&P documentation) Admitting Diagnosis: Chest pain [[id number]] Admitting Diagnosis: Cardiomyopathy [facility]) [[id number]] Admitting Physician: Daria Higgins [[id number]] Attending Physician: Daria Higgins [[id number]] Estimated Length of Stay: 2 Midnights Discharge Plan[de-identified] Home with Office Follow-up

## 2018-01-31 NOTE — H&P
History and Physical    Subjective:     Lee Ann Capps is a 32 y.o.  male who presents with chest pain that has been going on intermittently for the past several weeks. He initially indicates that he does not use cocaine however when pressed he admits to using last night at 11 pm and states that he uses fairly regularly. He further admits that he often gets chest pain when he uses cocaine that lasts about 5-10 minutes at a time. He gets a lot of anxiety with the chest pain. He has pain that is sharp and radiates into the back without pain radiating into the neck, jaw, or upper extremities. He has pain across the anterior chest.       Past Medical History:   Diagnosis Date    Hypertension       No past surgical history on file. No family history on file. Social History   Substance Use Topics    Smoking status: Current Every Day Smoker     Packs/day: 1.00     Years: 6.00    Smokeless tobacco: Never Used    Alcohol use Yes      Comment: socially       Prior to Admission medications    Medication Sig Start Date End Date Taking? Authorizing Provider   bisacodyl (DULCOLAX, BISACODYL,) 5 mg EC tablet Take 1 tablet by mouth twice a day as needed for constipation. 12/26/17   Vesta Workman PA-C   magnesium citrate solution Take 1/3 of bottle every 8 hours until finished or until you have a bowel movement. 12/26/17   Vesta Workman PA-C   nystatin (MYCOSTATIN) topical cream Apply  to affected area two (2) times a day. 7/27/17   Lavell Mcbride PA-C   triamcinolone acetonide (KENALOG) 0.025 % topical cream Apply  to affected area two (2) times a day. use thin layer 7/27/17   Lavell Mcbride PA-C   HYDROcodone-acetaminophen (NORCO) 5-325 mg per tablet Take 1 Tab by mouth every eight (8) hours as needed for Pain. Max Daily Amount: 3 Tabs. 7/21/17   Vesta Workman PA-C   cyclobenzaprine (FLEXERIL) 5 mg tablet Take 1 Tab by mouth two (2) times a day.  7/21/17   Vesta Workman PA-C oxyCODONE-acetaminophen (PERCOCET) 5-325 mg per tablet Take 1 tablet every 4-6 hours as needed for pain control. If you were instructed to try over the counter ibuprofen or tylenol, only take the percocet for pain not controlled with the over the counter medication. 10/26/16   Tena Dry, DO   cyclobenzaprine (FLEXERIL) 10 mg tablet Take 0.5 Tabs by mouth three (3) times daily as needed for Muscle Spasm(s). 10/26/16   Tena Dry, DO   ibuprofen (MOTRIN) 600 mg tablet Take 1 Tab by mouth every six (6) hours as needed for Pain. 10/26/16   Tena Dry, DO   HYDROcodone-acetaminophen (NORCO) 5-325 mg per tablet Take 1 Tab by mouth every four (4) hours as needed for Pain. Max Daily Amount: 6 Tabs. 12/25/15   VINCENT Mayer   methocarbamol (ROBAXIN) 500 mg tablet Take 1 Tab by mouth four (4) times daily. 12/25/15   VINCENT Mayer     No Known Allergies     Review of Systems:  A comprehensive review of systems was negative except for that written in the History of Present Illness. Objective: Intake and Output:            Physical Exam:   General appearance: alert, cooperative, no distress, appears stated age  Head: Normocephalic, without obvious abnormality, atraumatic  Throat: Lips, mucosa, and tongue normal. Teeth and gums normal  Neck: supple, symmetrical, trachea midline, no adenopathy, thyroid: not enlarged, symmetric, no tenderness/mass/nodules, no carotid bruit and no JVD  Back: symmetric, no curvature. ROM normal. No CVA tenderness. Lungs: clear to auscultation bilaterally  Chest wall: no tenderness  Heart: regular rate and rhythm, S1, S2 normal, no murmur, click, rub or gallop  Abdomen: soft, non-tender.  Bowel sounds normal. No masses,  no organomegaly  Extremities: extremities normal, atraumatic, no cyanosis or edema  Skin: Skin color, texture, turgor normal. No rashes or lesions  Neurologic: Grossly normal    Data Review:   Recent Results (from the past 24 hour(s))   EKG, 12 LEAD, INITIAL    Collection Time: 01/31/18 10:31 AM   Result Value Ref Range    Ventricular Rate 59 BPM    Atrial Rate 59 BPM    P-R Interval 148 ms    QRS Duration 86 ms    Q-T Interval 532 ms    QTC Calculation (Bezet) 526 ms    Calculated P Axis 53 degrees    Calculated R Axis 11 degrees    Calculated T Axis -136 degrees    Diagnosis       Sinus bradycardia  Minimal voltage criteria for LVH, may be normal variant  Marked T wave abnormality, consider inferior ischemia  Marked T wave abnormality, consider anterolateral ischemia  Prolonged QT  Abnormal ECG  No previous ECGs available     CARDIAC PANEL,(CK, CKMB & TROPONIN)    Collection Time: 01/31/18 11:02 AM   Result Value Ref Range     (H) 39 - 308 U/L    CK - MB 1.4 <3.6 ng/ml    CK-MB Index 0.4 0.0 - 4.0 %    Troponin-I, Qt. <0.02 0.0 - 0.045 NG/ML   CBC WITH AUTOMATED DIFF    Collection Time: 01/31/18 11:02 AM   Result Value Ref Range    WBC 5.4 4.6 - 13.2 K/uL    RBC 4.88 4.70 - 5.50 M/uL    HGB 15.0 13.0 - 16.0 g/dL    HCT 43.5 36.0 - 48.0 %    MCV 89.1 74.0 - 97.0 FL    MCH 30.7 24.0 - 34.0 PG    MCHC 34.5 31.0 - 37.0 g/dL    RDW 12.2 11.6 - 14.5 %    PLATELET 180 831 - 688 K/uL    MPV 9.9 9.2 - 11.8 FL    NEUTROPHILS 62 40 - 73 %    LYMPHOCYTES 30 21 - 52 %    MONOCYTES 6 3 - 10 %    EOSINOPHILS 2 0 - 5 %    BASOPHILS 0 0 - 2 %    ABS. NEUTROPHILS 3.3 1.8 - 8.0 K/UL    ABS. LYMPHOCYTES 1.6 0.9 - 3.6 K/UL    ABS. MONOCYTES 0.3 0.05 - 1.2 K/UL    ABS. EOSINOPHILS 0.1 0.0 - 0.4 K/UL    ABS.  BASOPHILS 0.0 0.0 - 0.06 K/UL    DF AUTOMATED     METABOLIC PANEL, COMPREHENSIVE    Collection Time: 01/31/18 11:02 AM   Result Value Ref Range    Sodium 139 136 - 145 mmol/L    Potassium 3.7 3.5 - 5.5 mmol/L    Chloride 104 100 - 108 mmol/L    CO2 29 21 - 32 mmol/L    Anion gap 6 3.0 - 18 mmol/L    Glucose 95 74 - 99 mg/dL    BUN 14 7.0 - 18 MG/DL    Creatinine 1.29 0.6 - 1.3 MG/DL    BUN/Creatinine ratio 11 (L) 12 - 20      GFR est AA >60 >60 ml/min/1.73m2    GFR est non-AA >60 >60 ml/min/1.73m2    Calcium 9.0 8.5 - 10.1 MG/DL    Bilirubin, total 0.8 0.2 - 1.0 MG/DL    ALT (SGPT) 30 16 - 61 U/L    AST (SGOT) 20 15 - 37 U/L    Alk. phosphatase 85 45 - 117 U/L    Protein, total 8.8 (H) 6.4 - 8.2 g/dL    Albumin 4.3 3.4 - 5.0 g/dL    Globulin 4.5 (H) 2.0 - 4.0 g/dL    A-G Ratio 1.0 0.8 - 1.7     PROTHROMBIN TIME + INR    Collection Time: 18 11:02 AM   Result Value Ref Range    Prothrombin time 12.9 11.5 - 15.2 sec    INR 1.0 0.8 - 1.2     PTT    Collection Time: 18 11:02 AM   Result Value Ref Range    aPTT 30.5 23.0 - 36.4 SEC   POC TROPONIN-I    Collection Time: 18 11:16 AM   Result Value Ref Range    Troponin-I (POC) <0.04 0.00 - 0.08 ng/mL   EKG, 12 LEAD, SUBSEQUENT    Collection Time: 18 12:00 PM   Result Value Ref Range    Ventricular Rate 67 BPM    Atrial Rate 67 BPM    P-R Interval 148 ms    QRS Duration 100 ms    Q-T Interval 514 ms    QTC Calculation (Bezet) 543 ms    Calculated P Axis 46 degrees    Calculated R Axis 12 degrees    Calculated T Axis -151 degrees    Diagnosis       Normal sinus rhythm with sinus arrhythmia  Minimal voltage criteria for LVH, may be normal variant  Marked T wave abnormality, consider anterolateral ischemia  Prolonged QT  Abnormal ECG  When compared with ECG of 2018 10:31,  No significant change was found       Transthoracic Echocardiogram    Patient: Berta Najera  MRN: [id number]  ACCT #: [de-identified]  : 1990  Age: 32 years  Gender: Male  Height: 69 in  Weight: 204.6 lb  BSA: 2.09 m-sq  BP: 156 / 116 mmHg  Study date: 2018  Status: Stat  Location: [facility] [address] #: [id number]    Allergies: NO KNOWN ALLERGIES    Referring_Ordering Physician: VINCENT Zaragoza  Interpreting Group: [address]  Interpreting Physician: Eric Hernández. Ankit Caban MD  Technologist: Vish Rangel. JULIO Bautista    IMPRESSIONS:  These features are consistent with dilated cardiomyopathy. SUMMARY:  Left ventricle:  The ventricle was mildly dilated. Systolic function was   normal. Ejection fraction was estimated in the  range of 30 % to 35 %. There were no regional wall motion abnormalities. There was moderate diffuse hypokinesis. Wall  thickness was moderately increased. INDICATIONS: Abnormal EKG and Chest Pain. HISTORY: Prior history: Risk factors: hypertension and a history of current   cigarette use (within the last month). PROCEDURE: This was a stat study. The study included complete 2D imaging,   M-mode, complete spectral Doppler, and color  Doppler. The heart rate was 63 bpm, at the start of the study. Systolic blood   pressure was 156 mmHg, at the start of  the study. Diastolic blood pressure was 116 mmHg, at the start of the study. Image quality was adequate. LEFT VENTRICLE: The ventricle was mildly dilated. Systolic function was   normal. Ejection fraction was estimated in the  range of 30 % to 35 %. There were no regional wall motion abnormalities. There was moderate diffuse hypokinesis. Wall  thickness was moderately increased. DOPPLER: Left ventricular diastolic   function parameters were normal for the  patient's age. RIGHT VENTRICLE: The size was normal. Systolic function was normal.    LEFT ATRIUM: Size was normal. LA volume index was 35 ml/m-sq. RIGHT ATRIUM: Size was normal.    MITRAL VALVE: Normal valve structure. There was normal leaflet separation. DOPPLER: The transmitral velocity was within  the normal range. There was no evidence for stenosis. There was no   significant regurgitation. AORTIC VALVE: The valve was trileaflet. Leaflets exhibited normal thickness   and normal cuspal separation. DOPPLER:  Transaortic velocity was within the normal range. There was no stenosis. There was trivial regurgitation. TRICUSPID VALVE: Normal valve structure. There was normal leaflet separation.   DOPPLER: The transtricuspid velocity was  within the normal range. There was no evidence for tricuspid stenosis. There   was trivial regurgitation. Tricuspid  regurgitation peak velocity: 2.1 m/sec. Right atrial pressure estimate: 3   mmHg. Pulmonary artery systolic pressure: 21  mmHg. PULMONIC VALVE: Not well visualized, but normal Doppler findings. AORTA: The root exhibited upper limit of normal size. SYSTEMIC VEINS: IVC: The inferior vena cava was normal in size and course. Respirophasic changes were normal.    PERICARDIUM: There was no pericardial effusion. The pericardium was normal in   appearance. 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 Cardiology has been consulted, I will continue his home meds. I have discussed the case with the intensivist.  We will transfer out of the ICU when the drip is discontinued and the patients chest pain has stopped. He is currently NPO, he is on a heparin drip. He is a full code.       Signed By: Yuki Shen MD     January 31, 2018

## 2018-01-31 NOTE — ED NOTES
Pt requesting to leave ED. Pt states that he feels better. Pt reports that he is nervous about having to go to court. Pt requests that we fax a document stating he is in the ED to [facility]. Pt reassured that the fax can be done. Pt agrees to stay at this time. Dr. Constance Ambriz informed.

## 2018-01-31 NOTE — ED PROVIDER NOTES
EMERGENCY DEPARTMENT HISTORY AND PHYSICAL EXAM    Date: 1/31/2018  Patient Name: Red Dover    History of Presenting Illness     Chief Complaint   Patient presents with    Chest Congestion         History Provided By: Patient    Chief Complaint:Nasal congestion and chest pain  Duration: 12 Hours  Timing:  Intermittent  Location: midsternal  Quality: burning  Severity: Moderate  Modifying Factors:   Associated Symptoms: intermittent SOB with exertion      Additional History (Context): Red Dover is a 32 y.o. male with hypertension who presents with C/O nasal congestion, chest congestion, and chest pain that started 12 hours ago. Patient reports he was watching TV when he started having burning chest pain, chest congestion, and nasal congestion. He reports worsening pain when moving and pain alleviated at rest. Pt reports nasal congestion that's been draining making him clear his throat. Patient denies taking anything for symptoms. Pt reports his brother, 36 YO, has a Hx of enlarged heart. No Hx of sudden cardiac death in the family. Pt denies fever, chills, sore throat, or any other symptoms or complaints. PCP: None    Current Facility-Administered Medications   Medication Dose Route Frequency Provider Last Rate Last Dose    heparin 25,000 units in D5W 250 ml infusion  10.75-25 Units/kg/hr IntraVENous TITRATE Jahaira Turner NP 10 mL/hr at 01/31/18 1115 1,000 Units/hr at 01/31/18 1115    nitroglycerin (TRIDIL) 400 mcg/ml infusion  0-20 mcg/min IntraVENous TITRATE Candace Harris DO 1.5 mL/hr at 01/31/18 1113 10 mcg/min at 01/31/18 1113     Current Outpatient Prescriptions   Medication Sig Dispense Refill    bisacodyl (DULCOLAX, BISACODYL,) 5 mg EC tablet Take 1 tablet by mouth twice a day as needed for constipation. 20 Tab 0    magnesium citrate solution Take 1/3 of bottle every 8 hours until finished or until you have a bowel movement.  1 Bottle 0    nystatin (MYCOSTATIN) topical cream Apply to affected area two (2) times a day. 15 g 0    triamcinolone acetonide (KENALOG) 0.025 % topical cream Apply  to affected area two (2) times a day. use thin layer 15 g 0    HYDROcodone-acetaminophen (NORCO) 5-325 mg per tablet Take 1 Tab by mouth every eight (8) hours as needed for Pain. Max Daily Amount: 3 Tabs. 8 Tab 0    cyclobenzaprine (FLEXERIL) 5 mg tablet Take 1 Tab by mouth two (2) times a day. 8 Tab 0    oxyCODONE-acetaminophen (PERCOCET) 5-325 mg per tablet Take 1 tablet every 4-6 hours as needed for pain control. If you were instructed to try over the counter ibuprofen or tylenol, only take the percocet for pain not controlled with the over the counter medication. 6 Tab 0    cyclobenzaprine (FLEXERIL) 10 mg tablet Take 0.5 Tabs by mouth three (3) times daily as needed for Muscle Spasm(s). 10 Tab 0    ibuprofen (MOTRIN) 600 mg tablet Take 1 Tab by mouth every six (6) hours as needed for Pain. 20 Tab 0    HYDROcodone-acetaminophen (NORCO) 5-325 mg per tablet Take 1 Tab by mouth every four (4) hours as needed for Pain. Max Daily Amount: 6 Tabs. 12 Tab 0    methocarbamol (ROBAXIN) 500 mg tablet Take 1 Tab by mouth four (4) times daily. 20 Tab 0       Past History     Past Medical History:  Past Medical History:   Diagnosis Date    Hypertension        Past Surgical History:  History reviewed. No pertinent surgical history. Family History:  History reviewed. No pertinent family history. Social History:  Social History   Substance Use Topics    Smoking status: Current Every Day Smoker     Packs/day: 1.00     Years: 6.00    Smokeless tobacco: Never Used    Alcohol use Yes      Comment: socially       Allergies:  No Known Allergies      Review of Systems   Review of Systems   Constitutional: Negative for appetite change, chills and fever. HENT: Positive for congestion and rhinorrhea. Negative for ear pain, sinus pain and sinus pressure. Respiratory: Positive for cough.  Negative for shortness of breath and wheezing. Cardiovascular: Positive for chest pain. Gastrointestinal: Positive for nausea. Negative for abdominal pain, diarrhea and vomiting. Neurological: Positive for dizziness, light-headedness and headaches. All Other Systems Negative  Physical Exam     Vitals:    01/31/18 1215 01/31/18 1230 01/31/18 1245 01/31/18 1300   BP: (!) 157/110 (!) 141/103 (!) 168/108 (!) 157/115   Pulse: 62 66 91 96   Resp: 11 12  21   Temp:       SpO2: 100% 100% 99% 100%   Weight:       Height:         Physical Exam   Constitutional: He is oriented to person, place, and time. He appears well-developed and well-nourished. No distress. HENT:   Head: Normocephalic and atraumatic. Mouth/Throat: No oropharyngeal exudate. Postnasal drip   Eyes: Pupils are equal, round, and reactive to light. Neck: Normal range of motion. Neck supple. Cardiovascular: Normal rate, regular rhythm and intact distal pulses. Murmur heard. Pulmonary/Chest: Effort normal and breath sounds normal. No respiratory distress. He has no wheezes. He has no rales. He exhibits no tenderness. Lungs CTAB   Abdominal: Soft. Bowel sounds are normal. He exhibits no distension and no mass. There is no tenderness. There is no rebound and no guarding. Musculoskeletal: Normal range of motion. Lymphadenopathy:     He has no cervical adenopathy. Neurological: He is alert and oriented to person, place, and time. Skin: Skin is warm and dry. He is not diaphoretic.          Diagnostic Study Results     Labs -     Recent Results (from the past 12 hour(s))   EKG, 12 LEAD, INITIAL    Collection Time: 01/31/18 10:31 AM   Result Value Ref Range    Ventricular Rate 59 BPM    Atrial Rate 59 BPM    P-R Interval 148 ms    QRS Duration 86 ms    Q-T Interval 532 ms    QTC Calculation (Bezet) 526 ms    Calculated P Axis 53 degrees    Calculated R Axis 11 degrees    Calculated T Axis -136 degrees    Diagnosis       Sinus bradycardia  Minimal voltage criteria for LVH, may be normal variant  Marked T wave abnormality, consider inferior ischemia  Marked T wave abnormality, consider anterolateral ischemia  Prolonged QT  Abnormal ECG  No previous ECGs available     CARDIAC PANEL,(CK, CKMB & TROPONIN)    Collection Time: 01/31/18 11:02 AM   Result Value Ref Range     (H) 39 - 308 U/L    CK - MB 1.4 <3.6 ng/ml    CK-MB Index 0.4 0.0 - 4.0 %    Troponin-I, Qt. <0.02 0.0 - 0.045 NG/ML   CBC WITH AUTOMATED DIFF    Collection Time: 01/31/18 11:02 AM   Result Value Ref Range    WBC 5.4 4.6 - 13.2 K/uL    RBC 4.88 4.70 - 5.50 M/uL    HGB 15.0 13.0 - 16.0 g/dL    HCT 43.5 36.0 - 48.0 %    MCV 89.1 74.0 - 97.0 FL    MCH 30.7 24.0 - 34.0 PG    MCHC 34.5 31.0 - 37.0 g/dL    RDW 12.2 11.6 - 14.5 %    PLATELET 536 564 - 799 K/uL    MPV 9.9 9.2 - 11.8 FL    NEUTROPHILS 62 40 - 73 %    LYMPHOCYTES 30 21 - 52 %    MONOCYTES 6 3 - 10 %    EOSINOPHILS 2 0 - 5 %    BASOPHILS 0 0 - 2 %    ABS. NEUTROPHILS 3.3 1.8 - 8.0 K/UL    ABS. LYMPHOCYTES 1.6 0.9 - 3.6 K/UL    ABS. MONOCYTES 0.3 0.05 - 1.2 K/UL    ABS. EOSINOPHILS 0.1 0.0 - 0.4 K/UL    ABS. BASOPHILS 0.0 0.0 - 0.06 K/UL    DF AUTOMATED     METABOLIC PANEL, COMPREHENSIVE    Collection Time: 01/31/18 11:02 AM   Result Value Ref Range    Sodium 139 136 - 145 mmol/L    Potassium 3.7 3.5 - 5.5 mmol/L    Chloride 104 100 - 108 mmol/L    CO2 29 21 - 32 mmol/L    Anion gap 6 3.0 - 18 mmol/L    Glucose 95 74 - 99 mg/dL    BUN 14 7.0 - 18 MG/DL    Creatinine 1.29 0.6 - 1.3 MG/DL    BUN/Creatinine ratio 11 (L) 12 - 20      GFR est AA >60 >60 ml/min/1.73m2    GFR est non-AA >60 >60 ml/min/1.73m2    Calcium 9.0 8.5 - 10.1 MG/DL    Bilirubin, total 0.8 0.2 - 1.0 MG/DL    ALT (SGPT) 30 16 - 61 U/L    AST (SGOT) 20 15 - 37 U/L    Alk.  phosphatase 85 45 - 117 U/L    Protein, total 8.8 (H) 6.4 - 8.2 g/dL    Albumin 4.3 3.4 - 5.0 g/dL    Globulin 4.5 (H) 2.0 - 4.0 g/dL    A-G Ratio 1.0 0.8 - 1.7     PROTHROMBIN TIME + INR    Collection Time: 01/31/18 11:02 AM   Result Value Ref Range    Prothrombin time 12.9 11.5 - 15.2 sec    INR 1.0 0.8 - 1.2     PTT    Collection Time: 01/31/18 11:02 AM   Result Value Ref Range    aPTT 30.5 23.0 - 36.4 SEC   POC TROPONIN-I    Collection Time: 01/31/18 11:16 AM   Result Value Ref Range    Troponin-I (POC) <0.04 0.00 - 0.08 ng/mL   EKG, 12 LEAD, SUBSEQUENT    Collection Time: 01/31/18 12:00 PM   Result Value Ref Range    Ventricular Rate 67 BPM    Atrial Rate 67 BPM    P-R Interval 148 ms    QRS Duration 100 ms    Q-T Interval 514 ms    QTC Calculation (Bezet) 543 ms    Calculated P Axis 46 degrees    Calculated R Axis 12 degrees    Calculated T Axis -151 degrees    Diagnosis       Normal sinus rhythm with sinus arrhythmia  Minimal voltage criteria for LVH, may be normal variant  Marked T wave abnormality, consider anterolateral ischemia  Prolonged QT  Abnormal ECG  When compared with ECG of 31-JAN-2018 10:31,  No significant change was found     DRUG SCREEN, URINE    Collection Time: 01/31/18 12:55 PM   Result Value Ref Range    BENZODIAZEPINES NEGATIVE  NEG      BARBITURATES NEGATIVE  NEG      THC (TH-CANNABINOL) NEGATIVE  NEG      OPIATES POSITIVE (A) NEG      PCP(PHENCYCLIDINE) NEGATIVE  NEG      COCAINE POSITIVE (A) NEG      AMPHETAMINES NEGATIVE  NEG      METHADONE NEGATIVE  NEG      HDSCOM (NOTE)        Radiologic Studies -   XR CHEST PA LAT   Final Result        CT Results  (Last 48 hours)    None        CXR Results  (Last 48 hours)               01/31/18 1048  XR CHEST PA LAT Final result    Impression:  IMPRESSION:   1. No acute findings. Narrative:  EXAMINATION: Chest 2 views       INDICATION: Chest congestion, chest pain       COMPARISON: None       FINDINGS: Frontal and lateral views of the chest obtained. No consolidation. Mediastinal silhouette and pulmonary vasculature unremarkable. No evidence of   pneumothorax. No acute osseous findings.                    Medical Decision Making   I am the first provider for this patient. I reviewed the vital signs, available nursing notes, past medical history, past surgical history, family history and social history. Vital Signs-Reviewed the patient's vital signs. Pulse Oximetry Analysis - 99% on room air    EKG: Interpreted by the EP. Dr. Maricarmen Gregorio   Time Interpreted: 10:44 AM    Rate: 59   Rhythm: Sinus Bradycardia   Interpretation: deep T wave inversions in inferior, anteriorolateral leads   Comparison: unable to find prior EKG for comparison    Records Reviewed: Nursing Notes    Procedures:  Procedures    Provider Notes (Medical Decision Making):     Differential Diagnosis:     10:44 AM: Dr. Cherrie Almazan at bedside to see patient d/t abnormal EKG. Will order cardiac enzymes, place patient on cardiac monitor, give ASA 324mg chewable, sublingual nitro,  and move over to main side of ED. Patient verbalized understanding of plan. I was personally available for consultation in the emergency department. I have reviewed the chart prior to the patient being discharged and agree with the documentation recorded by the [facility], including the assessment, treatment plan, and disposition. Maricarmen Gregorio, DO    10:I evaluated pt in fast track and c/o constant cp since last night, was 8 out of 10 pain sharp and burning. He states pain is now 5/10. States his brother has an enlarged heart. Pt with PMhx HTN but not on bp meds. He denies any other PMhx. Is a smokier. Pt with significant EKG changes (deep t wave inversions in inferior and anterolateral leads; ?wellens like). I will move him to main side ED and consult cardiology. Heparin, ntg, ASA, NPO. I assumed care of pt. Maricarmen Gregorio DO      Consult:  Discussed care with John Turner, Specialty: Cardiology  Standard discussion; including history of patients chief complaint, available diagnostic results, and treatment course. She accepts consult and agrees with started heparin, and nitro. Keep pt npo.    10:57 AM, 1/31/2018   Oli Randolph DO    I discussed plan for meds, admission and cardio consult to pt and he agrees. Oli Randolph DO    11:15 AM Suni in ED evaluating pt. I had repeated discussions with pt for recommendation to admit. Pt agrees. 11:45 AM Dr Judd Parr in ED evaluating pt. States admit to hospitalist. Getting   Oli Randolph DO    I reassessed pt and he states pain has significantly improved since ntg gtt. In no apparent distress. Oli Randolph DO    WBC wnl  Cr wnl  Trop <0.02    Consult:  Discussed care with Dr Ese Gage, Specialty: Hospitalist  Standard discussion; including history of patients chief complaint, available diagnostic results, and treatment course. He accepts admit  12:10 PM, 1/31/2018     Pt admits to cocaine/heroine abuse, snorts daily throughout the day. Says he snorted heroine last at 8am. Is concerned for withdrawal. Feels anxious now. Will give small dose catapres. His BP improved but still elevated on ntg gtt 154/101. Will discuss this with hospitalist about possible withdrawal.    Critical care time:   I have spent 33 minutes of critical care time involved in lab review, consultations with specialist, family decision making, and documentation. During this entire length of time I was immediately available to the patient. Critical care: The reason for providing this level of medical care for this critically ill patient was due to a critical illness that impaired one or more vital organ systems such that there was a high probability of imminent or life threatening deterioration in the patients condition. This care involved high complexity decision making to assess, manipulate and support vital system functions, to treat this degree vital organ system failure and to prevent further life threatening deterioration of the patient's condition.        MED RECONCILIATION:  Current Facility-Administered Medications   Medication Dose Route Frequency    heparin 25,000 units in D5W 250 ml infusion  10.75-25 Units/kg/hr IntraVENous TITRATE    nitroglycerin (TRIDIL) 400 mcg/ml infusion  0-20 mcg/min IntraVENous TITRATE     Current Outpatient Prescriptions   Medication Sig    bisacodyl (DULCOLAX, BISACODYL,) 5 mg EC tablet Take 1 tablet by mouth twice a day as needed for constipation.  magnesium citrate solution Take 1/3 of bottle every 8 hours until finished or until you have a bowel movement.  nystatin (MYCOSTATIN) topical cream Apply  to affected area two (2) times a day.  triamcinolone acetonide (KENALOG) 0.025 % topical cream Apply  to affected area two (2) times a day. use thin layer    HYDROcodone-acetaminophen (NORCO) 5-325 mg per tablet Take 1 Tab by mouth every eight (8) hours as needed for Pain. Max Daily Amount: 3 Tabs.  cyclobenzaprine (FLEXERIL) 5 mg tablet Take 1 Tab by mouth two (2) times a day.  oxyCODONE-acetaminophen (PERCOCET) 5-325 mg per tablet Take 1 tablet every 4-6 hours as needed for pain control. If you were instructed to try over the counter ibuprofen or tylenol, only take the percocet for pain not controlled with the over the counter medication.  cyclobenzaprine (FLEXERIL) 10 mg tablet Take 0.5 Tabs by mouth three (3) times daily as needed for Muscle Spasm(s).  ibuprofen (MOTRIN) 600 mg tablet Take 1 Tab by mouth every six (6) hours as needed for Pain.  HYDROcodone-acetaminophen (NORCO) 5-325 mg per tablet Take 1 Tab by mouth every four (4) hours as needed for Pain. Max Daily Amount: 6 Tabs.  methocarbamol (ROBAXIN) 500 mg tablet Take 1 Tab by mouth four (4) times daily. Disposition:  Admitted    Follow-up Information     None          Current Discharge Medication List          ICD-10-CM ICD-9-CM   1. Chest pain, unspecified type R07.9 786.50   2. T wave inversion in EKG R94.31 794.31   3. Uncontrolled hypertension I10 401.9   4. Cocaine abuse F14.10 305.60       Core Measures:    For Hospitalized Patients:    1.  Hospitalization Decision Time:  The decision to hospitalize the patient was made by Dr. Sumaya Wright at 11:42 AM on 1/31/2018    2. Aspirin: Aspirin was given    Diagnosis     Clinical Impression:   1. Chest pain, unspecified type    2. T wave inversion in EKG    3. Uncontrolled hypertension    4.  Cocaine abuse

## 2018-01-31 NOTE — ED NOTES
Pt is adamant about leaving. Pt encouraged to stay however continues to request to leave. Hospitalist paged.

## 2018-01-31 NOTE — ROUTINE PROCESS
TRANSFER - OUT REPORT:    Verbal report given to SUJIT Bhandari(name) on [facility] Genin  being transferred to ICU (unit) for routine progression of care       Report consisted of patients Situation, Background, Assessment and   Recommendations(SBAR). Information from the following report(s) SBAR, ED Summary, Intake/Output, MAR, Recent Results and Cardiac Rhythm sinus arrythmia was reviewed with the receiving nurse. Lines:   Peripheral IV 01/31/18 Left Antecubital (Active)   Site Assessment Clean, dry, & intact 1/31/2018 11:00 AM   Phlebitis Assessment 0 1/31/2018 11:00 AM   Infiltration Assessment 0 1/31/2018 11:00 AM   Dressing Status Clean, dry, & intact 1/31/2018 11:00 AM   Dressing Type Transparent 1/31/2018 11:00 AM   Hub Color/Line Status Flushed;Patent 1/31/2018 11:00 AM       Peripheral IV 01/31/18 Left Hand (Active)   Site Assessment Clean, dry, & intact 1/31/2018 11:00 AM   Phlebitis Assessment 0 1/31/2018 11:00 AM   Infiltration Assessment 0 1/31/2018 11:00 AM   Dressing Status Clean, dry, & intact 1/31/2018 11:00 AM   Dressing Type Transparent 1/31/2018 11:00 AM   Hub Color/Line Status Patent; Flushed 1/31/2018 11:00 AM        Opportunity for questions and clarification was provided.       Patient transported with:   Monitor  Registered Nurse  Tech   Patient belongings  IV with Nitro and Heparin infusing

## 2018-01-31 NOTE — ED TRIAGE NOTES
Pt states he woke up yesterday not feeling well. Pt states he has chest congestion, and states that it hurts to take in a deep breath.

## 2018-01-31 NOTE — CONSULTS
[facility] - Consult Note    Consultation request by No admitting provider for patient encounter. for advice/opinion related to evaluating There are no admission diagnoses documented for this encounter. Chest pain. Date of  Admission: 1/31/2018  9:55 AM   Primary Care Physician:  None     Assessment:     -Chest pain atypical over the past 2-3 years, worse last night, constant but worse while lying flat. ECG abnormal with deep inferior and anterolateral T wave inversions, no previous for comparison.  -Hypertension, elevated on arrival, diagnosed years ago but does not take any medications.  -Tobacco abuse, 1 PPD. -Denies drug use but Cocaine positive 12/2017.   -Brother with history of enlarged heart. Plan: Will get stat echocardiogram.  Will empirically start heparin infusion. Will start nitro infusion for BP and CP. Recommend morphine for pain control. Continue ASA. Avoid BB with history of cocaine use. Tox screen pending. Keep NPO until above testing reviewed. History of Present Illness: This is a 32 y.o. male admitted for There are no admission diagnoses documented for this encounter. .      Patient complains of:  CP. Patient is a 32year old AAM with history of HTN, tobacco use and cocaine. Patient reports chest pain since last night, occurred at rest. Worse when lying flat. Eather Indigo left sided without radiation or associated symptoms. Patient reports he has had similar CP for the past 2-3 years but this was more severe and more constant. Patient denies exertional component. Patient has occasional LIZAMA. Patient denies orthopnea, PND, JOHNSON.  Patient denies being told he had abnormal ECG in the past.      Cardiac risk factors: smoking/ tobacco exposure, hypertension      Review of Symptoms:  Except as stated above include:  Constitutional:  negative  Respiratory:  negative  Cardiovascular:  negative  Gastrointestinal: negative  Genitourinary:  negative  Musculoskeletal: Negative  Neurological:  Negative  Dermatological:  Negative  Endocrinological: Negative  Psychological:  Negative    Constitutional: negative for fevers and chills  Eyes: negative for visual disturbance  Ears, nose, mouth, throat, and face: negative for nasal congestion  Respiratory: negative for cough  Cardiovascular: positive for chest pain, negative for dyspnea, orthopnea, lower extremity edema  Gastrointestinal: negative for vomiting and diarrhea  Genitourinary:negative for dysuria  Hematologic/lymphatic: negative for bleeding  Musculoskeletal:negative for muscle weakness  Neurological: negative for dizziness     Past Medical History:     Past Medical History:   Diagnosis Date    Hypertension          Social History:     Social History     Social History    Marital status: SINGLE     Spouse name: N/A    Number of children: N/A    Years of education: N/A     Social History Main Topics    Smoking status: Current Every Day Smoker     Packs/day: 1.00     Years: 6.00    Smokeless tobacco: Never Used    Alcohol use Yes      Comment: socially    Drug use: No    Sexual activity: Not on file     Other Topics Concern    Not on file     Social History Narrative        Family History:   No family history on file. Medications:   No Known Allergies     Current Facility-Administered Medications   Medication Dose Route Frequency    heparin 25,000 units in D5W 250 ml infusion  10.75-25 Units/kg/hr IntraVENous TITRATE    nitroglycerin (TRIDIL) 400 mcg/ml infusion  0-20 mcg/min IntraVENous TITRATE     Current Outpatient Prescriptions   Medication Sig    bisacodyl (DULCOLAX, BISACODYL,) 5 mg EC tablet Take 1 tablet by mouth twice a day as needed for constipation.  magnesium citrate solution Take 1/3 of bottle every 8 hours until finished or until you have a bowel movement.  nystatin (MYCOSTATIN) topical cream Apply  to affected area two (2) times a day.     triamcinolone acetonide (KENALOG) 0.025 % topical cream Apply  to affected area two (2) times a day. use thin layer    HYDROcodone-acetaminophen (NORCO) 5-325 mg per tablet Take 1 Tab by mouth every eight (8) hours as needed for Pain. Max Daily Amount: 3 Tabs.  cyclobenzaprine (FLEXERIL) 5 mg tablet Take 1 Tab by mouth two (2) times a day.  oxyCODONE-acetaminophen (PERCOCET) 5-325 mg per tablet Take 1 tablet every 4-6 hours as needed for pain control. If you were instructed to try over the counter ibuprofen or tylenol, only take the percocet for pain not controlled with the over the counter medication.  cyclobenzaprine (FLEXERIL) 10 mg tablet Take 0.5 Tabs by mouth three (3) times daily as needed for Muscle Spasm(s).  ibuprofen (MOTRIN) 600 mg tablet Take 1 Tab by mouth every six (6) hours as needed for Pain.  HYDROcodone-acetaminophen (NORCO) 5-325 mg per tablet Take 1 Tab by mouth every four (4) hours as needed for Pain. Max Daily Amount: 6 Tabs.  methocarbamol (ROBAXIN) 500 mg tablet Take 1 Tab by mouth four (4) times daily.          Physical Exam:     Visit Vitals    BP (!) 169/114 (BP 1 Location: Left arm, BP Patient Position: Sitting)    Pulse 90    Temp 98.6 °F (37 °C)    Resp 16    Ht 5' 9\" (1.753 m)    Wt 93 kg (205 lb)    SpO2 99%    BMI 30.27 kg/m2     BP Readings from Last 3 Encounters:   01/31/18 (!) 169/114   12/26/17 (!) 142/92   07/27/17 (!) 158/100     Pulse Readings from Last 3 Encounters:   01/31/18 90   12/26/17 68   07/27/17 63     Wt Readings from Last 3 Encounters:   01/31/18 93 kg (205 lb)   12/26/17 88.5 kg (195 lb)   07/27/17 88.5 kg (195 lb)       General:  alert, cooperative, no distress, appears stated age  Neck:  no JVD  Lungs:  clear to auscultation bilaterally  Heart:  regular rate and rhythm, S1, S2 normal, no murmur, click, rub or gallop  Abdomen:  abdomen is soft without significant tenderness, masses, organomegaly or guarding  Extremities:  extremities normal, atraumatic, no cyanosis or edema  Skin: Warm and dry. no hyperpigmentation, vitiligo, or suspicious lesions  Neuro: alert, oriented x3, affect appropriate  Psych: non focal     Data Review:     No results for input(s): WBC, HGB, HCT, PLT, HGBEXT, HCTEXT, PLTEXT in the last 72 hours. No results for input(s): NA, K, CL, CO2, GLU, BUN, CREA, CA, MG, PHOS, ALB, TBIL, SGOT, ALT, INR in the last 72 hours. No lab exists for component:  BNP, INREXT    Results for orders placed or performed during the hospital encounter of 09/27/13   EKG, 12 LEAD, INITIAL    Narrative    Test Reason : Chest Pain, .50  Blood Pressure :  mmHG  Vent. Rate : 078 BPM     Atrial Rate : 078 BPM     P-R Int : 172 ms          QRS Dur : 084 ms      QT Int : 396 ms       P-R-T Axes : 059 038 012 degrees     QTc Int : 451 ms    Normal sinus rhythm  Nonspecific T wave abnormality  Abnormal ECG  Baseline wander  No previous ECGs available  Confirmed by Liana Novak M.D., Deshawn Rangel ([id number]) on 9/28/2013 12:41:13 PM    Referred By:             Overread By: Maribel Casas M.D.        All Cardiac Markers in the last 24 hours:  No results found for: CPK, CK, CKMMB, CKMB, RCK3, CKMBT, CKNDX, CKND1, ROSALIE, TROPT, TROIQ, KHADAR, TROPT, TNIPOC, BNP, BNPP    Last Lipid:  No results found for: CHOL, CHOLX, CHLST, CHOLV, HDL, LDL, LDLC, DLDLP, TGLX, TRIGL, TRIGP, CHHD, CHHDX    Signed By: VINCENT Willis     January 31, 2018

## 2018-02-01 LAB
ATRIAL RATE: 59 BPM
ATRIAL RATE: 67 BPM
CALCULATED P AXIS, ECG09: 46 DEGREES
CALCULATED P AXIS, ECG09: 53 DEGREES
CALCULATED R AXIS, ECG10: 11 DEGREES
CALCULATED R AXIS, ECG10: 12 DEGREES
CALCULATED T AXIS, ECG11: -136 DEGREES
CALCULATED T AXIS, ECG11: -151 DEGREES
DIAGNOSIS, 93000: NORMAL
DIAGNOSIS, 93000: NORMAL
P-R INTERVAL, ECG05: 148 MS
P-R INTERVAL, ECG05: 148 MS
Q-T INTERVAL, ECG07: 514 MS
Q-T INTERVAL, ECG07: 532 MS
QRS DURATION, ECG06: 100 MS
QRS DURATION, ECG06: 86 MS
QTC CALCULATION (BEZET), ECG08: 526 MS
QTC CALCULATION (BEZET), ECG08: 543 MS
VENTRICULAR RATE, ECG03: 59 BPM
VENTRICULAR RATE, ECG03: 67 BPM

## 2020-06-24 ENCOUNTER — HOSPITAL ENCOUNTER (EMERGENCY)
Age: 30
Discharge: HOME | End: 2020-06-24
Attending: EMERGENCY MEDICINE
Payer: SELF-PAY

## 2020-06-24 VITALS
TEMPERATURE: 98.6 F | SYSTOLIC BLOOD PRESSURE: 158 MMHG | HEIGHT: 69 IN | RESPIRATION RATE: 16 BRPM | OXYGEN SATURATION: 95 % | BODY MASS INDEX: 31.1 KG/M2 | WEIGHT: 210 LBS | HEART RATE: 81 BPM | DIASTOLIC BLOOD PRESSURE: 105 MMHG

## 2020-06-24 DIAGNOSIS — I10 ESSENTIAL HYPERTENSION: ICD-10-CM

## 2020-06-24 DIAGNOSIS — E86.0 DEHYDRATION: Primary | ICD-10-CM

## 2020-06-24 LAB
ANION GAP SERPL CALC-SCNC: 5 MMOL/L (ref 3–18)
BASOPHILS # BLD: 0 K/UL (ref 0–0.1)
BASOPHILS NFR BLD: 0 % (ref 0–2)
BUN SERPL-MCNC: 15 MG/DL (ref 7–18)
BUN/CREAT SERPL: 12 (ref 12–20)
CALCIUM SERPL-MCNC: 9.3 MG/DL (ref 8.5–10.1)
CHLORIDE SERPL-SCNC: 107 MMOL/L (ref 100–111)
CK SERPL-CCNC: 502 U/L (ref 39–308)
CO2 SERPL-SCNC: 30 MMOL/L (ref 21–32)
CREAT SERPL-MCNC: 1.28 MG/DL (ref 0.6–1.3)
DIFFERENTIAL METHOD BLD: ABNORMAL
EOSINOPHIL # BLD: 0 K/UL (ref 0–0.4)
EOSINOPHIL NFR BLD: 1 % (ref 0–5)
ERYTHROCYTE [DISTWIDTH] IN BLOOD BY AUTOMATED COUNT: 11.9 % (ref 11.6–14.5)
GLUCOSE SERPL-MCNC: 137 MG/DL (ref 74–99)
HCT VFR BLD AUTO: 40.4 % (ref 36–48)
HGB BLD-MCNC: 13.7 G/DL (ref 13–16)
LYMPHOCYTES # BLD: 1.4 K/UL (ref 0.9–3.6)
LYMPHOCYTES NFR BLD: 27 % (ref 21–52)
MCH RBC QN AUTO: 31 PG (ref 24–34)
MCHC RBC AUTO-ENTMCNC: 33.9 G/DL (ref 31–37)
MCV RBC AUTO: 91.4 FL (ref 74–97)
MONOCYTES # BLD: 0.3 K/UL (ref 0.05–1.2)
MONOCYTES NFR BLD: 5 % (ref 3–10)
NEUTS SEG # BLD: 3.4 K/UL (ref 1.8–8)
NEUTS SEG NFR BLD: 67 % (ref 40–73)
PLATELET # BLD AUTO: 244 K/UL (ref 135–420)
PMV BLD AUTO: 10.2 FL (ref 9.2–11.8)
POTASSIUM SERPL-SCNC: 3.5 MMOL/L (ref 3.5–5.5)
RBC # BLD AUTO: 4.42 M/UL (ref 4.7–5.5)
SODIUM SERPL-SCNC: 142 MMOL/L (ref 136–145)
WBC # BLD AUTO: 5 K/UL (ref 4.6–13.2)

## 2020-06-24 PROCEDURE — 99284 EMERGENCY DEPT VISIT MOD MDM: CPT

## 2020-06-24 PROCEDURE — 93005 ELECTROCARDIOGRAM TRACING: CPT

## 2020-06-24 PROCEDURE — 82550 ASSAY OF CK (CPK): CPT

## 2020-06-24 PROCEDURE — 80048 BASIC METABOLIC PNL TOTAL CA: CPT

## 2020-06-24 PROCEDURE — 85025 COMPLETE CBC W/AUTO DIFF WBC: CPT

## 2020-06-24 RX ORDER — AMLODIPINE BESYLATE 10 MG/1
10 TABLET ORAL DAILY
Qty: 14 TAB | Refills: 0 | Status: SHIPPED | OUTPATIENT
Start: 2020-06-24 | End: 2020-07-08

## 2020-06-24 NOTE — DISCHARGE INSTRUCTIONS
Patient Education        Dehydration: Care Instructions  Your Care Instructions  Dehydration happens when your body loses too much fluid. This might happen when you do not drink enough water or you lose large amounts of fluids from your body because of diarrhea, vomiting, or sweating. Severe dehydration can be life-threatening. Water and minerals called electrolytes help put your body fluids back in balance. Learn the early signs of fluid loss, and drink more fluids to prevent dehydration. Follow-up care is a key part of your treatment and safety. Be sure to make and go to all appointments, and call your doctor if you are having problems. It's also a good idea to know your test results and keep a list of the medicines you take. How can you care for yourself at home? · To prevent dehydration, drink plenty of fluids, enough so that your urine is light yellow or clear like water. Choose water and other caffeine-free clear liquids until you feel better. If you have kidney, heart, or liver disease and have to limit fluids, talk with your doctor before you increase the amount of fluids you drink. · If you do not feel like eating or drinking, try taking small sips of water, sports drinks, or other rehydration drinks. · Get plenty of rest.  To prevent dehydration  · Add more fluids to your diet and daily routine, unless your doctor has told you not to. · During hot weather, drink more fluids. Drink even more fluids if you exercise a lot. Stay away from drinks with alcohol or caffeine. · Watch for the symptoms of dehydration. These include:  ? A dry, sticky mouth. ? Dark yellow urine, and not much of it. ? Dry and sunken eyes. ? Feeling very tired. · Learn what problems can lead to dehydration. These include:  ? Diarrhea, fever, and vomiting. ? Any illness with a fever, such as pneumonia or the flu. ?  Activities that cause heavy sweating, such as endurance races and heavy outdoor work in hot or humid weather. ? Alcohol or drug use or problems caused by quitting their use (withdrawal). ? Certain medicines, such as cold and allergy pills (antihistamines), diet pills (diuretics), and laxatives. ? Certain diseases, such as diabetes, cancer, and heart or kidney disease. When should you call for help? [id number] anytime you think you may need emergency care. For example, call if:  · You passed out (lost consciousness). Call your doctor now or seek immediate medical care if:  · You are confused and cannot think clearly. · You are dizzy or lightheaded, or you feel like you may faint. · You have signs of needing more fluids. You have sunken eyes and a dry mouth, and you pass only a little dark urine. · You cannot keep fluids down. Watch closely for changes in your health, and be sure to contact your doctor if:  · You are not making tears. · Your skin is very dry and sags slowly back into place after you pinch it. · Your mouth and eyes are very dry. Where can you learn more? Go to http://frantz-sivakumar.info/  Enter Q814 in the search box to learn more about \"Dehydration: Care Instructions. \"  Current as of: June 26, 2019               Content Version: 12.5  © 2163-2947 Healthwise, Incorporated. Care instructions adapted under license by [employer] (which disclaims liability or warranty for this information). If you have questions about a medical condition or this instruction, always ask your healthcare professional. [place] [address] any warranty or liability for your use of this information. Patient Education        High Blood Pressure: Care Instructions  Overview     It's normal for blood pressure to go up and down throughout the day. But if it stays up, you have high blood pressure. Another name for high blood pressure is hypertension.   Despite what a lot of people think, high blood pressure usually doesn't cause headaches or make you feel dizzy or lightheaded. It usually has no symptoms. But it does increase your risk of stroke, heart attack, and other problems. You and your doctor will talk about your risks of these problems based on your blood pressure. Your doctor will give you a goal for your blood pressure. Your goal will be based on your health and your age. Lifestyle changes, such as eating healthy and being active, are always important to help lower blood pressure. You might also take medicine to reach your blood pressure goal.  Follow-up care is a key part of your treatment and safety. Be sure to make and go to all appointments, and call your doctor if you are having problems. It's also a good idea to know your test results and keep a list of the medicines you take. How can you care for yourself at home? Medical treatment  · If you stop taking your medicine, your blood pressure will go back up. You may take one or more types of medicine to lower your blood pressure. Be safe with medicines. Take your medicine exactly as prescribed. Call your doctor if you think you are having a problem with your medicine. · Talk to your doctor before you start taking aspirin every day. Aspirin can help certain people lower their risk of a heart attack or stroke. But taking aspirin isn't right for everyone, because it can cause serious bleeding. · See your doctor regularly. You may need to see the doctor more often at first or until your blood pressure comes down. · If you are taking blood pressure medicine, talk to your doctor before you take decongestants or anti-inflammatory medicine, such as ibuprofen. Some of these medicines can raise blood pressure. · Learn how to check your blood pressure at home. Lifestyle changes  · Stay at a healthy weight. This is especially important if you put on weight around the waist. Losing even 10 pounds can help you lower your blood pressure. · If your doctor recommends it, get more exercise. Walking is a good choice.  Bit by bit, increase the amount you walk every day. Try for at least 30 minutes on most days of the week. You also may want to swim, bike, or do other activities. · Avoid or limit alcohol. Talk to your doctor about whether you can drink any alcohol. · Try to limit how much sodium you eat to less than 2,300 milligrams (mg) a day. Your doctor may ask you to try to eat less than 1,500 mg a day. · Eat plenty of fruits (such as bananas and oranges), vegetables, legumes, whole grains, and low-fat dairy products. · Lower the amount of saturated fat in your diet. Saturated fat is found in animal products such as milk, cheese, and meat. Limiting these foods may help you lose weight and also lower your risk for heart disease. · Do not smoke. Smoking increases your risk for heart attack and stroke. If you need help quitting, talk to your doctor about stop-smoking programs and medicines. These can increase your chances of quitting for good. When should you call for help? Call  911 anytime you think you may need emergency care. This may mean having symptoms that suggest that your blood pressure is causing a serious heart or blood vessel problem. Your blood pressure may be over 180/120. For example, call 911 if:  · You have symptoms of a heart attack. These may include:  ? Chest pain or pressure, or a strange feeling in the chest.  ? Sweating. ? Shortness of breath. ? Nausea or vomiting. ? Pain, pressure, or a strange feeling in the back, neck, jaw, or upper belly or in one or both shoulders or arms. ? Lightheadedness or sudden weakness. ? A fast or irregular heartbeat. · You have symptoms of a stroke. These may include:  ? Sudden numbness, tingling, weakness, or loss of movement in your face, arm, or leg, especially on only one side of your body. ? Sudden vision changes. ? Sudden trouble speaking. ? Sudden confusion or trouble understanding simple statements. ? Sudden problems with walking or balance.   ? A sudden, severe headache that is different from past headaches. · You have severe back or belly pain. Do not wait until your blood pressure comes down on its own. Get help right away. Call your doctor now or seek immediate care if:  · Your blood pressure is much higher than normal (such as 180/120 or higher), but you don't have symptoms. · You think high blood pressure is causing symptoms, such as:  ? Severe headache.  ? Blurry vision. Watch closely for changes in your health, and be sure to contact your doctor if:  · Your blood pressure measures higher than your doctor recommends at least 2 times. That means the top number is higher or the bottom number is higher, or both. · You think you may be having side effects from your blood pressure medicine. Where can you learn more? Go to http://frantz-sivakumar.info/  Enter [id number] in the search box to learn more about \"High Blood Pressure: Care Instructions. \"  Current as of: December 16, 2019               Content Version: 12.5  © 6127-1281 Healthwise, Incorporated. Care instructions adapted under license by [employer] (which disclaims liability or warranty for this information). If you have questions about a medical condition or this instruction, always ask your healthcare professional. [address] any warranty or liability for your use of this information.

## 2020-06-24 NOTE — ED PROVIDER NOTES
EMERGENCY DEPARTMENT HISTORY AND PHYSICAL EXAM    Date: 6/24/2020  Patient Name: Francisco Jenkins    History of Presenting Illness     Chief Complaint   Patient presents with    Hypertension    Dizziness         History Provided By: patient        Additional History (Context): Francisco Jenkins is a 31-year-old-year-old male with history of hypertension presenting to the ED with complaint of lightheadedness. Patient state she takes bp meds but has been out for a month.states he was given meds while incarcerated and has not followed up with PCP since being released. States his head feels fuzzy but no headache. No dizziness, change in vision, no cp, sob, numbness, tingling or any other complaints. 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 States he feels dehydrated. PCP: None        Past History     Past Medical History:  Past Medical History:   Diagnosis Date    Hypertension        Past Surgical History:  History reviewed. No pertinent surgical history. Family History:  History reviewed. No pertinent family history. Social History:  Social History     Tobacco Use    Smoking status: Current Every Day Smoker     Packs/day: 1.00     Years: 6.00     Pack years: 6.00    Smokeless tobacco: Never Used   Substance Use Topics    Alcohol use: Yes     Comment: socially    Drug use: Yes     Types: Cocaine, Opiates       Allergies:  No Known Allergies      Review of Systems       Review of Systems   Constitutional: Negative for chills and fever. HENT: Negative for nasal congestion, sore throat, rhinorrhea  Eyes: Negative. Respiratory: Negative for cough and negative for shortness of breath. Cardiovascular: Negative for chest pain and palpitations. Gastrointestinal: Negative for abdominal pain, constipation, diarrhea, nausea and vomiting. Genitourinary: Negative. Negative for difficulty urinating and flank pain. Musculoskeletal: Negative for back pain.  Negative for gait problem and neck pain. Skin: Negative. Allergic/Immunologic: Negative. Neurological: Negative for dizziness, weakness, numbness and headaches. Pos for lightheadedness  Psychiatric/Behavioral: Negative. All other systems reviewed and are negative. All Other Systems Negative  Physical Exam     Vitals:    06/24/20 1151   BP: (!) 158/105   Pulse: 81   Resp: 16   Temp: 98.6 °F (37 °C)   SpO2: 95%   Weight: 95.3 kg (210 lb)   Height: 5' 9\" (1.753 m)     Physical Exam  Vitals signs and nursing note reviewed. Constitutional:       General: He is not in acute distress. Appearance: He is well-developed. He is not diaphoretic. HENT:      Head: Normocephalic and atraumatic. Nose: Nose normal.   Eyes:      Conjunctiva/sclera: Conjunctivae normal.      Pupils: Pupils are equal, round, and reactive to light. Neck:      Musculoskeletal: Normal range of motion and neck supple. Cardiovascular:      Rate and Rhythm: Normal rate and regular rhythm. Pulses: Normal pulses. Pulmonary:      Effort: Pulmonary effort is normal. No respiratory distress. Breath sounds: Normal breath sounds. Abdominal:      Palpations: Abdomen is soft. Musculoskeletal: Normal range of motion. Right lower leg: No edema. Left lower leg: No edema. Skin:     General: Skin is warm. Findings: No rash. Neurological:      General: No focal deficit present. Mental Status: He is alert and oriented to person, place, and time. Cranial Nerves: No cranial nerve deficit.       Coordination: Coordination normal.   Psychiatric:         Behavior: Behavior normal.           Diagnostic Study Results     Labs -     Recent Results (from the past 12 hour(s))   CBC WITH AUTOMATED DIFF    Collection Time: 06/24/20 12:00 PM   Result Value Ref Range    WBC 5.0 4.6 - 13.2 K/uL    RBC 4.42 (L) 4.70 - 5.50 M/uL    HGB 13.7 13.0 - 16.0 g/dL    HCT 40.4 36.0 - 48.0 %    MCV 91.4 74.0 - 97.0 FL    MCH 31.0 24.0 - 34.0 PG MCHC 33.9 31.0 - 37.0 g/dL    RDW 11.9 11.6 - 14.5 %    PLATELET 994 002 - 072 K/uL    MPV 10.2 9.2 - 11.8 FL    NEUTROPHILS 67 40 - 73 %    LYMPHOCYTES 27 21 - 52 %    MONOCYTES 5 3 - 10 %    EOSINOPHILS 1 0 - 5 %    BASOPHILS 0 0 - 2 %    ABS. NEUTROPHILS 3.4 1.8 - 8.0 K/UL    ABS. LYMPHOCYTES 1.4 0.9 - 3.6 K/UL    ABS. MONOCYTES 0.3 0.05 - 1.2 K/UL    ABS. EOSINOPHILS 0.0 0.0 - 0.4 K/UL    ABS. BASOPHILS 0.0 0.0 - 0.1 K/UL    DF AUTOMATED     METABOLIC PANEL, BASIC    Collection Time: 06/24/20 12:00 PM   Result Value Ref Range    Sodium 142 136 - 145 mmol/L    Potassium 3.5 3.5 - 5.5 mmol/L    Chloride 107 100 - 111 mmol/L    CO2 30 21 - 32 mmol/L    Anion gap 5 3.0 - 18 mmol/L    Glucose 137 (H) 74 - 99 mg/dL    BUN 15 7.0 - 18 MG/DL    Creatinine 1.28 0.6 - 1.3 MG/DL    BUN/Creatinine ratio 12 12 - 20      GFR est AA >60 >60 ml/min/1.73m2    GFR est non-AA >60 >60 ml/min/1.73m2    Calcium 9.3 8.5 - 10.1 MG/DL   CK    Collection Time: 06/24/20 12:00 PM   Result Value Ref Range     (H) 39 - 308 U/L   EKG, 12 LEAD, INITIAL    Collection Time: 06/24/20 12:02 PM   Result Value Ref Range    Ventricular Rate 64 BPM    Atrial Rate 64 BPM    P-R Interval 160 ms    QRS Duration 96 ms    Q-T Interval 414 ms    QTC Calculation (Bezet) 427 ms    Calculated P Axis 46 degrees    Calculated R Axis 41 degrees    Calculated T Axis -66 degrees    Diagnosis       Normal sinus rhythm with sinus arrhythmia  Marked T wave abnormality, consider anterolateral ischemia  Abnormal ECG  When compared with ECG of 31-JAN-2018 12:00,  QT has shortened         Radiologic Studies -   No orders to display     CT Results  (Last 48 hours)    None        CXR Results  (Last 48 hours)    None            Medical Decision Making   I am the first provider for this patient. I reviewed the vital signs, available nursing notes, past medical history, past surgical history, family history and social history.     Vital Signs-Reviewed the patient's vital signs. Records Reviewed: Nursing notes, old medical records and any previous labs, imaging, visits, consultations pertinent to patient care    Procedures:  Procedures      ED Course: Progress Notes, Reevaluation, and Consults:      Provider Notes (Medical Decision Making): Well appearing 33yo M here with c/o lightheadedness and concern of BP elevated. Not currently on medications. No cp, sob, dizziness, headache, change in vision. No indication for aggressive lowering, will check ck, basic labs, ekg. MED RECONCILIATION:  No current facility-administered medications for this encounter. Current Outpatient Medications   Medication Sig    amLODIPine (Norvasc) 10 mg tablet Take 1 Tab by mouth daily for 14 days. Disposition:  home    DISCHARGE NOTE:     Pt has been reexamined. Patient has no new complaints, changes, or physical findings. Care plan outlined and precautions discussed. Discussed proper way to take medications. Discussed treatment plan, return precautions, symptomatic relief, and expected time to improvement. All questions answered. Patient is stable for discharge and outpatient management. Patient is ready to go home. Follow-up Information     Follow up With Specialties Details Why Contact Info    [facility] Emergency Medicine   [address] [address]  [address]    [address] [facility] [address]  [phone]          Discharge Medication List as of 6/24/2020  1:14 PM      START taking these medications    Details   amLODIPine (Norvasc) 10 mg tablet Take 1 Tab by mouth daily for 14 days. , Print, Disp-14 Tab, R-0                   Diagnosis     Clinical Impression:   1. Dehydration    2. Essential hypertension            Dictation disclaimer:  Please note that this dictation was completed with [employer], the [employer] voice recognition software.   Quite often unanticipated grammatical, syntax, homophones, and other interpretive errors are inadvertently transcribed by the computer software. Please disregard these errors. Please excuse any errors that have escaped final proofreading.

## 2020-06-24 NOTE — LETTER
NOTIFICATION RETURN TO WORK / SCHOOL 
 
6/24/2020 1:14 PM 
 
Mr. Nimesh Jay [address] [place] [id number] To Whom It May Concern: 
 
Nimesh Jay is currently under the care of [facility] EM[facility]GENCY DEPT. He will return to work/school on: 6/26/20 If there are questions or concerns please have the patient contact our office. Sincerely, Foster Bowens PA-C

## 2020-06-24 NOTE — ED TRIAGE NOTES
Patient to triage with hypertension and dizziness. Symptoms started today. Pt denies chest pain and SOB.

## 2020-06-25 LAB
ATRIAL RATE: 64 BPM
CALCULATED P AXIS, ECG09: 46 DEGREES
CALCULATED R AXIS, ECG10: 41 DEGREES
CALCULATED T AXIS, ECG11: -66 DEGREES
DIAGNOSIS, 93000: NORMAL
P-R INTERVAL, ECG05: 160 MS
Q-T INTERVAL, ECG07: 414 MS
QRS DURATION, ECG06: 96 MS
QTC CALCULATION (BEZET), ECG08: 427 MS
VENTRICULAR RATE, ECG03: 64 BPM

## 2021-04-16 ENCOUNTER — HOSPITAL ENCOUNTER (EMERGENCY)
Age: 31
Discharge: HOME | End: 2021-04-17
Attending: EMERGENCY MEDICINE
Payer: COMMERCIAL

## 2021-04-16 DIAGNOSIS — R20.0 NUMBNESS AND TINGLING: Primary | ICD-10-CM

## 2021-04-16 DIAGNOSIS — R20.2 NUMBNESS AND TINGLING: Primary | ICD-10-CM

## 2021-04-16 PROCEDURE — 99282 EMERGENCY DEPT VISIT SF MDM: CPT

## 2021-04-16 RX ORDER — AMLODIPINE BESYLATE 10 MG/1
10 TABLET ORAL DAILY
COMMUNITY

## 2021-04-16 NOTE — LETTER
[address] [place] [address] Dr America Belle [facility] EMERGENCY DEPT 
[address] [facility] [address] [phone] Work/School Note Date: 4/16/2021 To Whom It May concern: 
 
Gustavo Singh was seen and treated today in the emergency room by the following provider(s): 
Attending Provider: Ximena Valencia MD 
Physician Assistant: Maye Haley PA-C. Gustavo Singh may return to work on 4.19.21. Sincerely, Vale Scott RN

## 2021-04-17 ENCOUNTER — APPOINTMENT (OUTPATIENT)
Dept: CT IMAGING | Age: 31
End: 2021-04-17
Attending: PHYSICIAN ASSISTANT
Payer: COMMERCIAL

## 2021-04-17 VITALS
TEMPERATURE: 98.1 F | WEIGHT: 233 LBS | HEART RATE: 66 BPM | RESPIRATION RATE: 16 BRPM | DIASTOLIC BLOOD PRESSURE: 75 MMHG | OXYGEN SATURATION: 95 % | SYSTOLIC BLOOD PRESSURE: 141 MMHG | BODY MASS INDEX: 34.41 KG/M2

## 2021-04-17 LAB
ANION GAP SERPL CALC-SCNC: 5 MMOL/L (ref 3–18)
BASOPHILS # BLD: 0 K/UL (ref 0–0.1)
BASOPHILS NFR BLD: 0 % (ref 0–2)
BUN SERPL-MCNC: 15 MG/DL (ref 7–18)
BUN/CREAT SERPL: 15 (ref 12–20)
CALCIUM SERPL-MCNC: 9.1 MG/DL (ref 8.5–10.1)
CHLORIDE SERPL-SCNC: 105 MMOL/L (ref 100–111)
CK SERPL-CCNC: 468 U/L (ref 39–308)
CO2 SERPL-SCNC: 30 MMOL/L (ref 21–32)
CREAT SERPL-MCNC: 1.01 MG/DL (ref 0.6–1.3)
DIFFERENTIAL METHOD BLD: ABNORMAL
EOSINOPHIL # BLD: 0.1 K/UL (ref 0–0.4)
EOSINOPHIL NFR BLD: 3 % (ref 0–5)
ERYTHROCYTE [DISTWIDTH] IN BLOOD BY AUTOMATED COUNT: 11.7 % (ref 11.6–14.5)
GLUCOSE SERPL-MCNC: 97 MG/DL (ref 74–99)
HCT VFR BLD AUTO: 38.3 % (ref 36–48)
HGB BLD-MCNC: 12.9 G/DL (ref 13–16)
LYMPHOCYTES # BLD: 2.1 K/UL (ref 0.9–3.6)
LYMPHOCYTES NFR BLD: 42 % (ref 21–52)
MCH RBC QN AUTO: 29.9 PG (ref 24–34)
MCHC RBC AUTO-ENTMCNC: 33.7 G/DL (ref 31–37)
MCV RBC AUTO: 88.7 FL (ref 74–97)
MONOCYTES # BLD: 0.4 K/UL (ref 0.05–1.2)
MONOCYTES NFR BLD: 7 % (ref 3–10)
NEUTS SEG # BLD: 2.4 K/UL (ref 1.8–8)
NEUTS SEG NFR BLD: 48 % (ref 40–73)
PLATELET # BLD AUTO: 254 K/UL (ref 135–420)
PMV BLD AUTO: 9.7 FL (ref 9.2–11.8)
POTASSIUM SERPL-SCNC: 3.7 MMOL/L (ref 3.5–5.5)
RBC # BLD AUTO: 4.32 M/UL (ref 4.35–5.65)
SODIUM SERPL-SCNC: 140 MMOL/L (ref 136–145)
WBC # BLD AUTO: 5.1 K/UL (ref 4.6–13.2)

## 2021-04-17 PROCEDURE — 80048 BASIC METABOLIC PNL TOTAL CA: CPT

## 2021-04-17 PROCEDURE — 82550 ASSAY OF CK (CPK): CPT

## 2021-04-17 PROCEDURE — 85025 COMPLETE CBC W/AUTO DIFF WBC: CPT

## 2021-04-17 RX ORDER — PREDNISONE 10 MG/1
TABLET ORAL
Qty: 21 TAB | Refills: 0 | Status: SHIPPED | OUTPATIENT
Start: 2021-04-17

## 2021-04-17 NOTE — DISCHARGE INSTRUCTIONS
Thermedical Activation    Thank you for requesting access to [employer]. Please follow the instructions below to securely access and download your online medical record. Thermedical allows you to send messages to your doctor, view your test results, renew your prescriptions, schedule appointments, and more. How Do I Sign Up? In your internet browser, go to [email or web address]  Click on the First Time User? Click Here link in the Sign In box. You will be redirect to the New Member Sign Up page. Enter your Thermedical Access Code exactly as it appears below. You will not need to use this code after youve completed the sign-up process. If you do not sign up before the expiration date, you must request a new code. Thermedical Access Code: [unfilled] (This is the date your Thermedical access code will )    Enter the last four digits of your Social Security Number (xxxx) and Date of Birth (mm/dd/yyyy) as indicated and click Submit. You will be taken to the next sign-up page. Create a Thermedical ID. This will be your Thermedical login ID and cannot be changed, so think of one that is secure and easy to remember. Create a [employer]medical password. You can change your password at any time. Enter your Password Reset Question and Answer. This can be used at a later time if you forget your password. Enter your e-mail address. You will receive e-mail notification when new information is available in [address] Click Sign Up. You can now view and download portions of your medical record. Click the [place] link to download a portable copy of your medical information. Additional Information    If you have questions, please visit the Frequently Asked Questions section of the Thermedical website at [email or web address] Remember, Thermedical is NOT to be used for urgent needs. For medical emergencies, dial 911.

## 2021-04-17 NOTE — ED PROVIDER NOTES
EMERGENCY DEPARTMENT HISTORY AND PHYSICAL EXAM    Date: 4/16/2021  Patient Name: Arturo Abdul    History of Presenting Illness     Chief Complaint   Patient presents with    Numbness         History Provided By: patient     Chief Complaint: numbness  Duration: 3 days  Timing: acute  Location: R index finger, R great toe  Quality: heaviness, feels \"puffy\"  Severity: moderate  Modifying Factors: none  Associated Symptoms: none      Additional History (Context): Arturo Abdul is a 33 y.o. male with PMH htn who presents with c/o 3 days of numbness to the R index finger and R great toe described as a heaviness and puffiness. Denies any known injury. Pt is right handed. Denies vision changes, slurred speech, headaches, dizziness, chest pain, and SOB. No other complaints reported at this time. PCP: None    Current Outpatient Medications   Medication Sig Dispense Refill    predniSONE (STERAPRED DS) 10 mg dose pack Use as directed 21 Tab 0    amLODIPine (Norvasc) 10 mg tablet Take 10 mg by mouth daily. Indications: high blood pressure         Past History     Past Medical History:  Past Medical History:   Diagnosis Date    Hypertension        Past Surgical History:  No past surgical history on file. Family History:  No family history on file. Social History:  Social History     Tobacco Use    Smoking status: Current Every Day Smoker     Packs/day: 1.00     Years: 6.00     Pack years: 6.00    Smokeless tobacco: Never Used   Substance Use Topics    Alcohol use: Yes     Comment: socially    Drug use: Yes     Types: Opiates, Heroin       Allergies:  No Known Allergies      Review of Systems   Review of Systems   Constitutional: Negative. Negative for chills and fever. HENT: Negative. Negative for congestion, ear pain and rhinorrhea. Eyes: Negative. Negative for pain and redness. Respiratory: Negative. Negative for cough, shortness of breath, wheezing and stridor. Cardiovascular: Negative. Negative for chest pain and leg swelling. Gastrointestinal: Negative. Negative for abdominal pain, constipation, diarrhea, nausea and vomiting. Genitourinary: Negative. Negative for dysuria and frequency. Musculoskeletal: Negative. Negative for back pain and neck pain. Skin: Negative. Negative for rash and wound. Neurological: Positive for numbness. Negative for dizziness, seizures, syncope and headaches. All other systems reviewed and are negative. All Other Systems Negative  Physical Exam     Vitals:    04/16/21 2344   BP: (!) 141/75   Pulse: 66   Resp: 16   Temp: 98.1 °F (36.7 °C)   SpO2: 95%   Weight: 105.7 kg (233 lb)     Physical Exam  Vitals signs and nursing note reviewed. Constitutional:       General: He is not in acute distress. Appearance: He is well-developed. He is obese. He is not diaphoretic. HENT:      Head: Normocephalic and atraumatic. Eyes:      General: No scleral icterus. Right eye: No discharge. Left eye: No discharge. Extraocular Movements: Extraocular movements intact. Conjunctiva/sclera: Conjunctivae normal.      Pupils: Pupils are equal, round, and reactive to light. Neck:      Musculoskeletal: Normal range of motion and neck supple. Cardiovascular:      Rate and Rhythm: Normal rate and regular rhythm. Heart sounds: Normal heart sounds. No murmur. No friction rub. No gallop. Pulmonary:      Effort: Pulmonary effort is normal. No respiratory distress. Breath sounds: Normal breath sounds. No stridor. No wheezing or rales. Musculoskeletal: Normal range of motion. Comments: No point bony TTP, edema or deformity noted on exam. Intact pulses. Skin:     General: Skin is warm and dry. Findings: No erythema or rash. Neurological:      Mental Status: He is alert and oriented to person, place, and time. Sensory: No sensory deficit.       Coordination: Coordination normal.      Comments: Gait is steady and patient exhibits no evidence of ataxia. Patient is able to ambulate without difficulty. No focal neurological deficit noted. No facial droop, slurred speech, or evidence of altered mentation noted on exam.   strength 4/5 on the R, 5/5 on the L. strength otherwise normal. Sensation intact to the BUE and BLE. Psychiatric:         Behavior: Behavior normal.         Thought Content: Thought content normal.                Diagnostic Study Results     Labs -     Recent Results (from the past 12 hour(s))   CBC WITH AUTOMATED DIFF    Collection Time: 04/17/21  1:20 AM   Result Value Ref Range    WBC 5.1 4.6 - 13.2 K/uL    RBC 4.32 (L) 4.35 - 5.65 M/uL    HGB 12.9 (L) 13.0 - 16.0 g/dL    HCT 38.3 36.0 - 48.0 %    MCV 88.7 74.0 - 97.0 FL    MCH 29.9 24.0 - 34.0 PG    MCHC 33.7 31.0 - 37.0 g/dL    RDW 11.7 11.6 - 14.5 %    PLATELET 903 982 - 360 K/uL    MPV 9.7 9.2 - 11.8 FL    NEUTROPHILS PENDING %    LYMPHOCYTES PENDING %    MONOCYTES PENDING %    EOSINOPHILS PENDING %    BASOPHILS PENDING %    ABS. NEUTROPHILS PENDING K/UL    ABS. LYMPHOCYTES PENDING K/UL    ABS. MONOCYTES PENDING K/UL    ABS. EOSINOPHILS PENDING K/UL    ABS.  BASOPHILS PENDING K/UL    DF PENDING    CK    Collection Time: 04/17/21  1:20 AM   Result Value Ref Range     (H) 39 - 353 U/L   METABOLIC PANEL, BASIC    Collection Time: 04/17/21  1:20 AM   Result Value Ref Range    Sodium 140 136 - 145 mmol/L    Potassium 3.7 3.5 - 5.5 mmol/L    Chloride 105 100 - 111 mmol/L    CO2 30 21 - 32 mmol/L    Anion gap 5 3.0 - 18 mmol/L    Glucose 97 74 - 99 mg/dL    BUN 15 7.0 - 18 MG/DL    Creatinine 1.01 0.6 - 1.3 MG/DL    BUN/Creatinine ratio 15 12 - 20      GFR est AA >60 >60 ml/min/1.73m2    GFR est non-AA >60 >60 ml/min/1.73m2    Calcium 9.1 8.5 - 10.1 MG/DL       Radiologic Studies -   CT HEAD WO CONT    (Results Pending)     CT Results  (Last 48 hours)    None        CXR Results  (Last 48 hours)    None            Medical Decision Making   I am the first provider for this patient. I reviewed the vital signs, available nursing notes, past medical history, past surgical history, family history and social history. Vital Signs-Reviewed the patient's vital signs. Records Reviewed: Renee Harvey PA-C     Procedures:  Procedures    Provider Notes (Medical Decision Making): Impression:  Numbness    CBC, BMP and CT head ordered. Renee Harvey PA-C     2:04 AM BMP unremarkable, CK slightly elevated, improved from prior     CT ordered, but pt refused. I have explained to the pt that CT imaging of his head is the only way to r/o acute intracranial process that could account for his sx. Pt made aware that without this imaging, serious medical conditions such as stroke, tumor, etc. could go undiagnosed. Although I have very low suspicion for stroke or mass, it is still recommended for the pt to have this imaging. Pt refused stating he wants to have his CT imaging done \"at a [facility].\" Labs are essentially unremarkable. Sx have been present for 3 days and have not progressed. Will plan to kinder d/c with prednisone taper and recommendation for close PCP follow-up within 24-48 hrs. Return precautions given. Pt agrees. Renee Harvey PA-C         MED RECONCILIATION:  No current facility-administered medications for this encounter. Current Outpatient Medications   Medication Sig    predniSONE (STERAPRED DS) 10 mg dose pack Use as directed    amLODIPine (Norvasc) 10 mg tablet Take 10 mg by mouth daily.  Indications: high blood pressure       Disposition:  D/c    DISCHARGE NOTE:       Follow-up Information     Follow up With Specialties Details Why [address]  In 1 day  [place] [address]  [phone]    [facility] EM[facility]ENC[facility] Emergency Medicine  As needed, If symptoms worsen [address] [address]  [phone]          Current Discharge Medication List      START taking these medications    Details   predniSONE (STERAPRED DS) 10 mg dose pack Use as directed  Qty: 21 Tab, Refills: 0               Diagnosis     Clinical Impression:   1.  Numbness and tingling

## 2021-04-17 NOTE — ED TRIAGE NOTES
Constant numbness to right hand and right great toe for the last 3 days. Denies any other symptom.   Pt states he is not diabetic

## 2021-04-17 NOTE — ED NOTES
Notified by ct tech that pt is refusing ct. Pt back in rw at this time. Provider aware.   piv removed per pt request

## 2021-04-29 ENCOUNTER — HOSPITAL ENCOUNTER (EMERGENCY)
Age: 31
Discharge: HOME | End: 2021-04-30
Attending: EMERGENCY MEDICINE
Payer: COMMERCIAL

## 2021-04-29 VITALS
TEMPERATURE: 98.3 F | DIASTOLIC BLOOD PRESSURE: 104 MMHG | BODY MASS INDEX: 33.33 KG/M2 | SYSTOLIC BLOOD PRESSURE: 141 MMHG | OXYGEN SATURATION: 97 % | WEIGHT: 225 LBS | RESPIRATION RATE: 16 BRPM | HEART RATE: 71 BPM | HEIGHT: 69 IN

## 2021-04-29 DIAGNOSIS — G56.02 CARPAL TUNNEL SYNDROME OF LEFT WRIST: Primary | ICD-10-CM

## 2021-04-29 PROCEDURE — 99282 EMERGENCY DEPT VISIT SF MDM: CPT

## 2021-04-29 NOTE — Clinical Note
[facility]ENC[facility]PT 
[address] [facility] [address] [phone] Work/School Note Date: 4/29/2021 To Whom It May concern: 
 
John Burks was seen and treated today in the emergency room by the following provider(s): 
Attending Provider: Schuyler Kanner, MD.   
 
John Burks is excused from work/school on 4/30/2021 through 5/3/2021. He is medically clear to return to work/school on 5/4/2021. Sincerely, Carli Kelly MD

## 2021-04-29 NOTE — Clinical Note
[facility] EMERGENCY DEPT 
[address] [facility] [address] [phone] Work/School Note Date: 4/29/2021 To Whom It May concern: 
 
 
Barbara Kennedy was seen and treated today in the emergency room by the following provider(s): 
Attending Provider: Elliot Anderson MD.   
 
Barbara Kennedy is excused from work/school on 04/30/21. He is clear to return to work/school on 05/01/21. Sincerely, Marisol Foy MD

## 2021-04-29 NOTE — LETTER
[facility] EMERGENCY DEPT 
[address] [facility] [address] [phone] Work/School Note Date: 4/29/2021 To Whom It May concern: 
 
Arturo Abdul was seen and treated today in the Emergency Room by the following Dr. Justyn Winston MD. 
   
 
Arturo Abdul may return to work on 4/30. His symptoms prevented him from going to work from 4/27-4/29. Sincerely, Justyn Winston MD

## 2021-04-30 NOTE — ED NOTES
Patient concerned about his wait time and would not like to wait anymore. Provider made aware. No orders received.

## 2021-04-30 NOTE — ED TRIAGE NOTES
Patient presents to the ED with complaints of numbness and tingling on the right arm. Patient states he was taking steroids that was therapeutic but he stopped taking medication because he missplace his prescription.

## 2021-05-01 NOTE — ED PROVIDER NOTES
EMERGENCY DEPARTMENT HISTORY AND PHYSICAL EXAM    1:14 AM  Date: 4/29/2021  Patient Name: Sulema Bobby    History of Presenting Illness     No chief complaint on file. History Provided By: Patient    HPI: Sulema Bobby is a 33 y.o. male with diagnosed with carpal tunnel and was discharged on steroid. Patient is here today only asking for a work note because he lost his prescription and was still having a lot of pain. Denies any new symptoms. No fever or swelling. Location:  Severity:  Timing/course: Onset/Duration:     PCP: None    Past History     Past Medical History:  Past Medical History:   Diagnosis Date    Hypertension        Past Surgical History:  No past surgical history on file. Family History:  No family history on file. Social History:  Social History     Tobacco Use    Smoking status: Current Every Day Smoker     Packs/day: 1.00     Years: 6.00     Pack years: 6.00    Smokeless tobacco: Never Used   Substance Use Topics    Alcohol use: Yes     Comment: socially    Drug use: Yes     Types: Opiates, Heroin       Allergies:  No Known Allergies    Review of Systems   Review of Systems   Musculoskeletal: Positive for arthralgias. Neurological: Positive for numbness. All other systems reviewed and are negative. Physical Exam     No data found. Physical Exam  Vitals signs and nursing note reviewed. Constitutional:       Appearance: Normal appearance. HENT:      Head: Normocephalic and atraumatic. Eyes:      Extraocular Movements: Extraocular movements intact. Neck:      Musculoskeletal: Normal range of motion and neck supple. Cardiovascular:      Rate and Rhythm: Normal rate. Pulmonary:      Effort: Pulmonary effort is normal. No respiratory distress. Musculoskeletal: Normal range of motion. General: No swelling, tenderness or deformity. Skin:     General: Skin is warm and dry. Neurological:      General: No focal deficit present.       Mental Status: He is alert. Psychiatric:         Mood and Affect: Mood normal.         Diagnostic Study Results     Labs -  No results found for this or any previous visit (from the past 12 hour(s)). Radiologic Studies -   No results found. Medical Decision Making     ED Course: Progress Notes, Reevaluation, and Consults:    1:14 AM Initial assessment performed. The patients presenting problems have been discussed, and they/their family are in agreement with the care plan formulated and outlined with them. I have encouraged them to ask questions as they arise throughout their visit. Provider Notes (Medical Decision Making): 67-year-old male presenting with persistent pain from carpal tunnel asking for a work note. No new complaints. Vitals are stable and patient is well-appearing. No concern for infection. Will discharge. Procedures:     Critical Care Time:     Vital Signs-Reviewed the patient's vital signs. Reviewed pt's pulse ox reading. EKG: Interpreted by the EP. Time Interpreted:    Rate:    Rhythm:    Interpretation:   Comparison:     Records Reviewed: Nursing Notes and Old Medical Records (Time of Review: 1:14 AM)  -I am the first provider for this patient.  -I reviewed the vital signs, available nursing notes, past medical history, past surgical history, family history and social history. Current Outpatient Medications   Medication Sig Dispense Refill    methylPREDNISolone (Medrol, Harris,) 4 mg tablet Take as instructed on the package 1 Dose Pack 0    predniSONE (STERAPRED DS) 10 mg dose pack Use as directed 21 Tab 0    amLODIPine (Norvasc) 10 mg tablet Take 10 mg by mouth daily. Indications: high blood pressure          Clinical Impression     Clinical Impression:   1. Carpal tunnel syndrome of left wrist        Disposition: dc        This note was dictated utilizing voice recognition software which may lead to typographical errors.   I apologize in advance if the situation occurs. If questions arise please do not hesitate to contact me or call our department.     aDyana Blunt MD  1:14 AM

## 2021-06-28 ENCOUNTER — HOSPITAL ENCOUNTER (EMERGENCY)
Age: 31
Discharge: LEFT BEFORE DISCHARGE | End: 2021-06-28
Attending: EMERGENCY MEDICINE
Payer: COMMERCIAL

## 2021-06-28 PROCEDURE — 75810000275 HC EMERGENCY DEPT VISIT NO LEVEL OF CARE

## 2021-06-28 NOTE — ED NOTES
Called the patient x2 ; walked  through both waiting rooms and the ER waiting room and was in of the find him.

## 2022-03-18 PROBLEM — I42.9 CARDIOMYOPATHY (HCC): Status: ACTIVE | Noted: 2018-01-31

## 2022-03-19 PROBLEM — I10 UNCONTROLLED HYPERTENSION: Status: ACTIVE | Noted: 2018-01-31

## 2022-03-19 PROBLEM — R07.9 CHEST PAIN: Status: ACTIVE | Noted: 2018-01-31

## 2022-03-19 PROBLEM — R94.31 T WAVE INVERSION IN EKG: Status: ACTIVE | Noted: 2018-01-31

## 2022-03-20 PROBLEM — R94.31 ABNORMAL EKG: Status: ACTIVE | Noted: 2018-01-31

## 2022-10-13 ENCOUNTER — HOSPITAL ENCOUNTER (EMERGENCY)
Age: 32
Discharge: STILL ADMITTED OR ADMINISTRATIVE | End: 2022-10-13
Attending: EMERGENCY MEDICINE

## 2022-11-29 ENCOUNTER — HOSPITAL ENCOUNTER (EMERGENCY)
Age: 32
Discharge: HOME | End: 2022-11-29
Attending: STUDENT IN AN ORGANIZED HEALTH CARE EDUCATION/TRAINING PROGRAM
Payer: COMMERCIAL

## 2022-11-29 VITALS
HEART RATE: 80 BPM | TEMPERATURE: 98 F | RESPIRATION RATE: 19 BRPM | DIASTOLIC BLOOD PRESSURE: 117 MMHG | OXYGEN SATURATION: 100 % | SYSTOLIC BLOOD PRESSURE: 170 MMHG

## 2022-11-29 DIAGNOSIS — K08.89 TOOTHACHE: ICD-10-CM

## 2022-11-29 DIAGNOSIS — K02.9 DENTAL CARIES: Primary | ICD-10-CM

## 2022-11-29 PROCEDURE — 99283 EMERGENCY DEPT VISIT LOW MDM: CPT

## 2022-11-29 RX ORDER — PENICILLIN V POTASSIUM 500 MG/1
500 TABLET, FILM COATED ORAL 4 TIMES DAILY
Qty: 40 TABLET | Refills: 0 | Status: SHIPPED | OUTPATIENT
Start: 2022-11-29 | End: 2022-11-29 | Stop reason: SDUPTHER

## 2022-11-29 RX ORDER — IBUPROFEN 800 MG/1
800 TABLET ORAL EVERY 8 HOURS
Qty: 15 TABLET | Refills: 0 | Status: SHIPPED | OUTPATIENT
Start: 2022-11-29 | End: 2022-11-29 | Stop reason: SDUPTHER

## 2022-11-29 RX ORDER — LIDOCAINE HYDROCHLORIDE 20 MG/ML
10 SOLUTION ORAL; TOPICAL 2 TIMES DAILY
Qty: 200 ML | Refills: 0 | Status: SHIPPED | OUTPATIENT
Start: 2022-11-29

## 2022-11-29 RX ORDER — IBUPROFEN 800 MG/1
800 TABLET ORAL EVERY 8 HOURS
Qty: 15 TABLET | Refills: 0 | Status: SHIPPED | OUTPATIENT
Start: 2022-11-29 | End: 2022-12-04

## 2022-11-29 RX ORDER — PENICILLIN V POTASSIUM 500 MG/1
500 TABLET, FILM COATED ORAL 4 TIMES DAILY
Qty: 40 TABLET | Refills: 0 | Status: SHIPPED | OUTPATIENT
Start: 2022-11-29 | End: 2022-12-09

## 2022-11-29 RX ORDER — OXYCODONE AND ACETAMINOPHEN 5; 325 MG/1; MG/1
1 TABLET ORAL
Qty: 12 TABLET | Refills: 0 | Status: SHIPPED | OUTPATIENT
Start: 2022-11-29 | End: 2022-11-29 | Stop reason: SDUPTHER

## 2022-11-29 RX ORDER — OXYCODONE AND ACETAMINOPHEN 5; 325 MG/1; MG/1
1 TABLET ORAL
Qty: 12 TABLET | Refills: 0 | Status: SHIPPED | OUTPATIENT
Start: 2022-11-29 | End: 2022-12-02

## 2022-11-29 RX ORDER — LIDOCAINE HYDROCHLORIDE 20 MG/ML
10 SOLUTION ORAL; TOPICAL 2 TIMES DAILY
Qty: 200 ML | Refills: 0 | Status: SHIPPED | OUTPATIENT
Start: 2022-11-29 | End: 2022-11-29 | Stop reason: SDUPTHER

## 2022-11-29 NOTE — Clinical Note
[address] [place] [address] Dr [facility] EMERGENCY [facility]PT  [address] [address] [phone]    Work/School Note    Date: 11/29/2022    To Whom It May concern:    Wendy Lopez was seen and treated today in the emergency room by the following provider(s):  Attending Provider: Heather Boston DO  Physician Assistant: VINCENT Pierce. Wendy Lopez is excused from work/school on 11/29/22 and 11/30/22. He is medically clear to return to work/school on 12/1/2022.        Sincerely,          VINCENT Dewitt

## 2022-11-29 NOTE — ED PROVIDER NOTES
EMERGENCY DEPARTMENT HISTORY AND PHYSICAL EXAM    Date: 11/29/2022  Patient Name: Dena Rosas    History of Presenting Illness     Chief Complaint   Patient presents with    Dental Pain         History Provided By: Patient    Additional History (Context): Dena Rosas is a 27 y.o. male with No significant past medical history who presents with upper right dental pain for the past 2 to 3 days pain is worse with p.o. and denies fever drooling or trismus does have a dentist but it will not be until another month and a half before he is able to be seen. Symptoms are moderate. PCP: None    Current Outpatient Medications   Medication Sig Dispense Refill    penicillin v potassium (VEETID) 500 mg tablet Take 1 Tablet by mouth four (4) times daily for 10 days. 40 Tablet 0    Lidocaine Viscous 2 % solution Take 10 mL by mouth two (2) times a day. Put 10ml onto guaze and put in mouth; hold for 30min. Can repeat up to twice daily. Indications: administration of local anesthetic drug 200 mL 0    ibuprofen (MOTRIN) 800 mg tablet Take 1 Tablet by mouth every eight (8) hours for 5 days. 15 Tablet 0    oxyCODONE-acetaminophen (Percocet) 5-325 mg per tablet Take 1 Tablet by mouth every six (6) hours as needed for Pain for up to 3 days. Max Daily Amount: 4 Tablets. 12 Tablet 0    methylPREDNISolone (Medrol, Harris,) 4 mg tablet Take as instructed on the package 1 Dose Pack 0    predniSONE (STERAPRED DS) 10 mg dose pack Use as directed 21 Tab 0    amLODIPine (Norvasc) 10 mg tablet Take 10 mg by mouth daily. Indications: high blood pressure         Past History     Past Medical History:  Past Medical History:   Diagnosis Date    Hypertension        Past Surgical History:  No past surgical history on file. Family History:  No family history on file.     Social History:  Social History     Tobacco Use    Smoking status: Every Day     Packs/day: 1.00     Years: 6.00     Pack years: 6.00     Types: Cigarettes    Smokeless tobacco: Never   Substance Use Topics    Alcohol use: Yes     Comment: socially    Drug use: Yes     Types: Opiates, Heroin       Allergies:  No Known Allergies      Review of Systems   Review of Systems   Constitutional:  Positive for appetite change. Negative for fever. HENT:  Positive for dental problem. Negative for drooling and facial swelling. All Other Systems Negative  Physical Exam     Vitals:    11/29/22 0654 11/29/22 0659 11/29/22 0840   BP:  (!) 178/122 (!) 170/117   Pulse: (!) 57  80   Resp: 18  19   Temp: 97.3 °F (36.3 °C)  98 °F (36.7 °C)   SpO2: 99%  100%     Physical Exam  Vitals and nursing note reviewed. Constitutional:       General: He is not in acute distress. Appearance: He is well-developed. He is not ill-appearing, toxic-appearing or diaphoretic. HENT:      Head: Normocephalic and atraumatic. Mouth/Throat:      Comments: Upper right third molar is eroded and tender. No adjacent gingival fluctuance seen or palpated. Neck:      Thyroid: No thyromegaly. Vascular: No carotid bruit. Trachea: No tracheal deviation. Cardiovascular:      Rate and Rhythm: Normal rate and regular rhythm. Heart sounds: Normal heart sounds. No murmur heard. No friction rub. No gallop. Pulmonary:      Effort: Pulmonary effort is normal. No respiratory distress. Breath sounds: Normal breath sounds. No stridor. No wheezing or rales. Chest:      Chest wall: No tenderness. Abdominal:      General: There is no distension. Palpations: Abdomen is soft. There is no mass. Tenderness: There is no abdominal tenderness. There is no guarding or rebound. Musculoskeletal:         General: Normal range of motion. Cervical back: Normal range of motion and neck supple. Skin:     General: Skin is warm and dry. Coloration: Skin is not pale. Neurological:      Mental Status: He is alert.    Psychiatric:         Speech: Speech normal.         Behavior: Behavior normal. Thought Content: Thought content normal.         Judgment: Judgment normal.              Diagnostic Study Results     Labs -   No results found for this or any previous visit (from the past 12 hour(s)). Radiologic Studies -   No orders to display     CT Results  (Last 48 hours)      None          CXR Results  (Last 48 hours)      None              Medical Decision Making   I am the first provider for this patient. I reviewed the vital signs, available nursing notes, past medical history, past surgical history, family history and social history. Vital Signs-Reviewed the patient's vital signs. Records Reviewed: Nursing Notes    Procedures:  Procedures    Provider Notes (Medical Decision Making): Treat dental caries and pain follow-up with dental no adjacent gingival abscess seen. MED RECONCILIATION:  No current facility-administered medications for this encounter. Current Outpatient Medications   Medication Sig    penicillin v potassium (VEETID) 500 mg tablet Take 1 Tablet by mouth four (4) times daily for 10 days. Lidocaine Viscous 2 % solution Take 10 mL by mouth two (2) times a day. Put 10ml onto guaze and put in mouth; hold for 30min. Can repeat up to twice daily. Indications: administration of local anesthetic drug    ibuprofen (MOTRIN) 800 mg tablet Take 1 Tablet by mouth every eight (8) hours for 5 days. oxyCODONE-acetaminophen (Percocet) 5-325 mg per tablet Take 1 Tablet by mouth every six (6) hours as needed for Pain for up to 3 days. Max Daily Amount: 4 Tablets. methylPREDNISolone (Medrol, Harris,) 4 mg tablet Take as instructed on the package    predniSONE (STERAPRED DS) 10 mg dose pack Use as directed    amLODIPine (Norvasc) 10 mg tablet Take 10 mg by mouth daily. Indications: high blood pressure       Disposition:  Home    DISCHARGE NOTE:   8:51 AM    Pt has been reexamined. Patient has no new complaints, changes, or physical findings.   Care plan outlined and precautions discussed. Results of exam were reviewed with the patient. All medications were reviewed with the patient; will d/c home with lidocaine penicillin ibuprofen Percocet. All of pt's questions and concerns were addressed. Patient was instructed and agrees to follow up with dental, as well as to return to the ED upon further deterioration. Patient is ready to go home. Follow-up Information       Follow up With Specialties Details Why [facility]  Schedule an appointment as soon as possible for a visit in 2 days  [address] [facility]  [phone]    [facility] EMERGENCY DEPT Emergency Medicine  If symptoms worsen return immediately [address]  [phone]            Current Discharge Medication List        START taking these medications    Details   penicillin v potassium (VEETID) 500 mg tablet Take 1 Tablet by mouth four (4) times daily for 10 days. Qty: 40 Tablet, Refills: 0  Start date: 11/29/2022, End date: 12/9/2022      Lidocaine Viscous 2 % solution Take 10 mL by mouth two (2) times a day. Put 10ml onto guaze and put in mouth; hold for 30min. Can repeat up to twice daily. Indications: administration of local anesthetic drug  Qty: 200 mL, Refills: 0  Start date: 11/29/2022      ibuprofen (MOTRIN) 800 mg tablet Take 1 Tablet by mouth every eight (8) hours for 5 days. Qty: 15 Tablet, Refills: 0  Start date: 11/29/2022, End date: 12/4/2022      oxyCODONE-acetaminophen (Percocet) 5-325 mg per tablet Take 1 Tablet by mouth every six (6) hours as needed for Pain for up to 3 days. Max Daily Amount: 4 Tablets. Qty: 12 Tablet, Refills: 0  Start date: 11/29/2022, End date: 12/2/2022    Comments: ED Attending: Parmjit Echevarria DO  MARIE: [id number]  Associated Diagnoses: Toothache               Diagnosis     Clinical Impression:   1. Dental caries    2.  Toothache

## 2022-11-29 NOTE — ED NOTES
Patient discharged to home. All personal belongings taken with patient. Discharge papers explained and given to patient. Escorted to ER exit.

## 2022-11-29 NOTE — Clinical Note
[address] [place] [address] Dr [facility] EMERGENCY DEPT  [address] [address] [phone]    Work/School Note    Date: 11/29/2022    To Whom It May concern:    Dayne Reynolds was seen and treated today in the emergency room by the following provider(s):  Attending Provider: Derek Mobley DO  Physician Assistant: VINCENT Ray. Dayne Reynolds is excused from work/school on 11/29/22 and 11/30/22. He is medically clear to return to work/school on 12/1/2022.        Sincerely,          Ottoniel Levy RN

## 2023-02-18 ENCOUNTER — HOSPITAL ENCOUNTER (EMERGENCY)
Facility: HOSPITAL | Age: 33
Discharge: HOME | End: 2023-02-18
Attending: EMERGENCY MEDICINE

## 2023-02-18 VITALS
RESPIRATION RATE: 11 BRPM | TEMPERATURE: 98.2 F | DIASTOLIC BLOOD PRESSURE: 96 MMHG | OXYGEN SATURATION: 96 % | HEART RATE: 83 BPM | SYSTOLIC BLOOD PRESSURE: 149 MMHG

## 2023-02-18 DIAGNOSIS — T40.601A OPIATE OVERDOSE, ACCIDENTAL OR UNINTENTIONAL, INITIAL ENCOUNTER (HCC): Primary | ICD-10-CM

## 2023-02-18 LAB
ALBUMIN SERPL-MCNC: 3.9 G/DL (ref 3.4–5)
ALBUMIN/GLOB SERPL: 1 (ref 0.8–1.7)
ALP SERPL-CCNC: 75 U/L (ref 45–117)
ALT SERPL-CCNC: 38 U/L (ref 16–61)
ANION GAP SERPL CALC-SCNC: 6 MMOL/L (ref 3–18)
AST SERPL-CCNC: 43 U/L (ref 10–38)
BASOPHILS # BLD: 0 K/UL (ref 0–0.1)
BASOPHILS NFR BLD: 0 % (ref 0–2)
BILIRUB SERPL-MCNC: 0.8 MG/DL (ref 0.2–1)
BUN SERPL-MCNC: 13 MG/DL (ref 7–18)
BUN/CREAT SERPL: 10 (ref 12–20)
CALCIUM SERPL-MCNC: 8.9 MG/DL (ref 8.5–10.1)
CHLORIDE SERPL-SCNC: 104 MMOL/L (ref 100–111)
CK SERPL-CCNC: 706 U/L (ref 39–308)
CO2 SERPL-SCNC: 29 MMOL/L (ref 21–32)
CREAT SERPL-MCNC: 1.35 MG/DL (ref 0.6–1.3)
DIFFERENTIAL METHOD BLD: ABNORMAL
EOSINOPHIL # BLD: 0.2 K/UL (ref 0–0.4)
EOSINOPHIL NFR BLD: 2 % (ref 0–5)
ERYTHROCYTE [DISTWIDTH] IN BLOOD BY AUTOMATED COUNT: 11.9 % (ref 11.6–14.5)
ETHANOL SERPL-MCNC: 37 MG/DL (ref 0–3)
GLOBULIN SER CALC-MCNC: 3.9 G/DL (ref 2–4)
GLUCOSE BLD STRIP.AUTO-MCNC: 135 MG/DL (ref 70–110)
GLUCOSE SERPL-MCNC: 147 MG/DL (ref 74–99)
HCT VFR BLD AUTO: 48.1 % (ref 36–48)
HGB BLD-MCNC: 15.9 G/DL (ref 13–16)
IMM GRANULOCYTES # BLD AUTO: 0.1 K/UL (ref 0–0.04)
IMM GRANULOCYTES NFR BLD AUTO: 1 % (ref 0–0.5)
LYMPHOCYTES # BLD: 1.9 K/UL (ref 0.9–3.6)
LYMPHOCYTES NFR BLD: 25 % (ref 21–52)
MAGNESIUM SERPL-MCNC: 2.3 MG/DL (ref 1.6–2.6)
MCH RBC QN AUTO: 31.6 PG (ref 24–34)
MCHC RBC AUTO-ENTMCNC: 33.1 G/DL (ref 31–37)
MCV RBC AUTO: 95.6 FL (ref 78–100)
MONOCYTES # BLD: 0.5 K/UL (ref 0.05–1.2)
MONOCYTES NFR BLD: 6 % (ref 3–10)
NEUTS SEG # BLD: 5 K/UL (ref 1.8–8)
NEUTS SEG NFR BLD: 66 % (ref 40–73)
NRBC # BLD: 0 K/UL (ref 0–0.01)
NRBC BLD-RTO: 0 PER 100 WBC
PLATELET # BLD AUTO: 234 K/UL (ref 135–420)
PMV BLD AUTO: 10.1 FL (ref 9.2–11.8)
POTASSIUM SERPL-SCNC: 3.6 MMOL/L (ref 3.5–5.5)
PROT SERPL-MCNC: 7.8 G/DL (ref 6.4–8.2)
RBC # BLD AUTO: 5.03 M/UL (ref 4.35–5.65)
SODIUM SERPL-SCNC: 139 MMOL/L (ref 136–145)
TROPONIN I SERPL HS-MCNC: 13 NG/L (ref 0–78)
WBC # BLD AUTO: 7.5 K/UL (ref 4.6–13.2)

## 2023-02-18 PROCEDURE — 82962 GLUCOSE BLOOD TEST: CPT

## 2023-02-18 PROCEDURE — 2580000003 HC RX 258: Performed by: EMERGENCY MEDICINE

## 2023-02-18 PROCEDURE — 96374 THER/PROPH/DIAG INJ IV PUSH: CPT

## 2023-02-18 PROCEDURE — 84484 ASSAY OF TROPONIN QUANT: CPT

## 2023-02-18 PROCEDURE — 83735 ASSAY OF MAGNESIUM: CPT

## 2023-02-18 PROCEDURE — 6360000002 HC RX W HCPCS: Performed by: EMERGENCY MEDICINE

## 2023-02-18 PROCEDURE — 82077 ASSAY SPEC XCP UR&BREATH IA: CPT

## 2023-02-18 PROCEDURE — 6370000000 HC RX 637 (ALT 250 FOR IP): Performed by: EMERGENCY MEDICINE

## 2023-02-18 PROCEDURE — 85025 COMPLETE CBC W/AUTO DIFF WBC: CPT

## 2023-02-18 PROCEDURE — 82550 ASSAY OF CK (CPK): CPT

## 2023-02-18 PROCEDURE — 80053 COMPREHEN METABOLIC PANEL: CPT

## 2023-02-18 PROCEDURE — 99284 EMERGENCY DEPT VISIT MOD MDM: CPT

## 2023-02-18 PROCEDURE — 93005 ELECTROCARDIOGRAM TRACING: CPT | Performed by: EMERGENCY MEDICINE

## 2023-02-18 RX ORDER — NALOXONE HYDROCHLORIDE 0.4 MG/ML
1 INJECTION, SOLUTION INTRAMUSCULAR; INTRAVENOUS; SUBCUTANEOUS
Status: COMPLETED | OUTPATIENT
Start: 2023-02-18 | End: 2023-02-18

## 2023-02-18 RX ORDER — 0.9 % SODIUM CHLORIDE 0.9 %
1000 INTRAVENOUS SOLUTION INTRAVENOUS ONCE
Status: COMPLETED | OUTPATIENT
Start: 2023-02-18 | End: 2023-02-18

## 2023-02-18 RX ORDER — ONDANSETRON 4 MG/1
4 TABLET, ORALLY DISINTEGRATING ORAL ONCE
Status: COMPLETED | OUTPATIENT
Start: 2023-02-18 | End: 2023-02-18

## 2023-02-18 RX ADMIN — NALOXONE HYDROCHLORIDE 1 MG: 0.4 INJECTION, SOLUTION INTRAMUSCULAR; INTRAVENOUS; SUBCUTANEOUS at 20:38

## 2023-02-18 RX ADMIN — ONDANSETRON 4 MG: 4 TABLET, ORALLY DISINTEGRATING ORAL at 20:47

## 2023-02-18 RX ADMIN — SODIUM CHLORIDE 1000 ML: 900 INJECTION, SOLUTION INTRAVENOUS at 20:47

## 2023-02-19 LAB
EKG ATRIAL RATE: 86 BPM
EKG DIAGNOSIS: NORMAL
EKG P AXIS: 46 DEGREES
EKG P-R INTERVAL: 156 MS
EKG Q-T INTERVAL: 408 MS
EKG QRS DURATION: 98 MS
EKG QTC CALCULATION (BAZETT): 488 MS
EKG R AXIS: 8 DEGREES
EKG T AXIS: -5 DEGREES
EKG VENTRICULAR RATE: 86 BPM

## 2023-02-19 NOTE — ED PROVIDER NOTES
Emergency Department Encounter  [facility] EMERGENCY DEPT    Patient: Gali Palm  MRN: [id number]  : 1990  Date of Evaluation: 2023  ED Provider: Tammy Kay MD    Chief Complaint       Chief Complaint   Patient presents with    Drug Overdose     SHARLENE Palm is a 27 y.o. male who presents to the emergency department ***      ROS:     At least 10 systems reviewed and otherwise acutely negative except as in the [address] Past History     Past Medical History:   Diagnosis Date    Hypertension      No past surgical history on file. Social History     Socioeconomic History    Marital status: Single   Tobacco Use    Smoking status: Every Day     Packs/day: 1.00     Types: Cigarettes    Smokeless tobacco: Never   Substance and Sexual Activity    Alcohol use: Yes    Drug use: Yes     Types: Heroin, Opiates        Medications/Allergies     Previous Medications    AMLODIPINE (NORVASC) 10 MG TABLET    Take 10 mg by mouth daily    LIDOCAINE VISCOUS HCL (XYLOCAINE) 2 % SOLN SOLUTION    Take 10 mLs by mouth 2 times daily    METHYLPREDNISOLONE (MEDROL DOSEPACK) 4 MG TABLET    Take as instructed on the package    PREDNISONE 10 MG (21) TBPK    Use as directed     No Known Allergies     Physical Exam       ED Triage Vitals [23 2030]   BP Temp Temp Source Pulse Resp SpO2 Height Weight   -- 98.2 °F (36.8 °C) Oral -- 16 -- -- --     GENERAL APPEARANCE: Cooperative. No acute distress. Somnolent although arousable with verbal stimuli. HEAD: Normocephalic. Atraumatic. EYES: Sclera anicteric. Pinpoint bilateral pupils. ENT: Tolerates saliva. No trismus. NECK: Supple. Trachea midline. No audible stridor or carotid bruit. CARDIO: RRR. Radial pulse 2+. No audible murmur. LUNGS: Respirations unlabored. CTAB and symmetrical fair to good air movement. .  ABDOMEN: Soft. Non-distended. Non-tender. EXTREMITIES: No acute deformities. No clubbing, edema, or cyanosis.   Distal pulses are intact and symmetrical.  SKIN: Warm and dry. No obvious lesions or discolorations. NEUROLOGICAL: No gross facial drooping. Moves all 4 extremities. Somnolent although easily arousable. Speech is slurred. Responds to questions appropriately when awakened. PSYCHIATRIC: Normal mood. Diagnostics   Labs:  Results for orders placed or performed during the hospital encounter of 02/18/23   POCT Glucose   Result Value Ref Range    POC Glucose 135 (H) 70 - 110 mg/dL     Radiographs:  No results found. Procedures/EKG:   EKG Interpretation    Interpreted by emergency department physician    Rhythm: normal sinus   Rate: 86 bpm  Axis: normal  Ectopy: none  Conduction: Prolonged QT with a QT/QTc of 408/488 ms  ST Segments: nonspecific changes  T Waves: non specific changes  Q Waves: none    Clinical Impression: Sinus rhythm no acute ST segment changes. Normal axis. Prolonged QT interval.  No significant change when compared to previous twelve-lead EKG. Gregory Geller MD      ED Course and MDM   In brief, Sin Goff is a 27 y.o. male who presented to the emergency department ***    ED Medication Orders (From admission, onward)      Start Ordered     Status Ordering Provider    02/18/23 2100 02/18/23 2034  0.9 % sodium chloride bolus  ONCE         Last MAR action: New Bag - by Juana Dress on 02/18/23 at 2047 [place] Life M    02/18/23 2100 02/18/23 2036  naloxone (NARCAN) injection 1 mg  NOW         Last MAR action: Given - by Juana Dress on 02/18/23 at 2038 Seng Life M    02/18/23 2100 02/18/23 2037  ondansetron (ZOFRAN-ODT) disintegrating tablet 4 mg  ONCE         Last MAR action: Given - by Juana Dress on 02/18/23 at 2047 Seng Life M            Final Impression    No diagnosis found.   DISPOSITION       (Please note that portions of this note may have been completed with a voice recognition program. Efforts were made to edit the dictations but occasionally words are mis-transcribed.)    Gregory Geller MD   [employer] to patient become more awake and alert. After being monitored in emergency department the patient continued to be alert and it is history of elevated discharge from the hospital.  At the time of disposition he was in no acute distress obvious discomfort. The patient was very much alert and awake. He agreed to a prescription for Narcan. He is encouraged to follow-up with outpatient rehab center. However, he is to return immediately if any worsening or concerning symptoms. At the time of disposition, the patient is in no acute distress or obvious discomfort. ED Medication Orders (From admission, onward)      Start Ordered     Status Ordering Provider    02/18/23 2100 02/18/23 2034  0.9 % sodium chloride bolus  ONCE         Last MAR action: Stopped - by Ryan Palmer on 02/18/23 at 2131 DUNCAN Galan    02/18/23 2100 02/18/23 2036  naloxone (NARCAN) injection 1 mg  NOW         Last MAR action: Given - by Ryan Palmer on 02/18/23 at 2038 Carson FLOREZ    02/18/23 2100 02/18/23 2037  ondansetron (ZOFRAN-ODT) disintegrating tablet 4 mg  ONCE         Last MAR action: Given - by Ryan Palmer on 02/18/23 at 2047 Carson FLOREZ            Final Impression      1.  Opiate overdose, accidental or unintentional, initial encounter [facility])      DISPOSITION Decision To Discharge 02/18/2023 09:21:02 PM     (Please note that portions of this note may have been completed with a voice recognition program. Efforts were made to edit the dictations but occasionally words are mis-transcribed.)    Fadi Potts MD  [address], [place]  02/23/23 3349

## 2023-02-19 NOTE — ED NOTES
Patient's two IV's removed, no pain or distress medically cleared refused to give urine.  Discharge instructions explained no questions at this time patient is A&Ox4     Iraj Lemus RN  02/18/23 2123
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
## 2023-03-07 NOTE — ED NOTES
The L.C. met with the client in the ER Department room 2 to discuss connecting them with a PCP. The client was given the [address] [place] [address] and the [facility]. Female

## 2023-08-31 ENCOUNTER — HOSPITAL ENCOUNTER (EMERGENCY)
Facility: HOSPITAL | Age: 33
Discharge: HOME | End: 2023-09-01
Attending: EMERGENCY MEDICINE
Payer: COMMERCIAL

## 2023-08-31 DIAGNOSIS — K08.89 PAIN, DENTAL: ICD-10-CM
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
## 2023-08-31 DIAGNOSIS — K02.9 DENTAL CARIES: ICD-10-CM

## 2023-08-31 DIAGNOSIS — I10 ELEVATED BLOOD PRESSURE READING IN OFFICE WITH DIAGNOSIS OF HYPERTENSION: ICD-10-CM

## 2023-08-31 PROCEDURE — 99283 EMERGENCY DEPT VISIT LOW MDM: CPT

## 2023-08-31 ASSESSMENT — PAIN DESCRIPTION - FREQUENCY: FREQUENCY: CONTINUOUS

## 2023-08-31 ASSESSMENT — PAIN DESCRIPTION - ONSET: ONSET: SUDDEN

## 2023-08-31 ASSESSMENT — PAIN DESCRIPTION - DESCRIPTORS: DESCRIPTORS: ACHING

## 2023-08-31 ASSESSMENT — PAIN DESCRIPTION - PAIN TYPE: TYPE: ACUTE PAIN

## 2023-08-31 ASSESSMENT — PAIN - FUNCTIONAL ASSESSMENT
PAIN_FUNCTIONAL_ASSESSMENT: 0-10
PAIN_FUNCTIONAL_ASSESSMENT: PREVENTS OR INTERFERES SOME ACTIVE ACTIVITIES AND ADLS

## 2023-08-31 ASSESSMENT — PAIN SCALES - GENERAL: PAINLEVEL_OUTOF10: 8

## 2023-08-31 ASSESSMENT — PAIN DESCRIPTION - ORIENTATION: ORIENTATION: RIGHT;LEFT;UPPER

## 2023-08-31 ASSESSMENT — PAIN DESCRIPTION - LOCATION: LOCATION: JAW;TEETH

## 2023-09-01 VITALS
BODY MASS INDEX: 31.1 KG/M2 | HEART RATE: 81 BPM | HEIGHT: 69 IN | TEMPERATURE: 98.4 F | WEIGHT: 210 LBS | DIASTOLIC BLOOD PRESSURE: 88 MMHG | OXYGEN SATURATION: 99 % | RESPIRATION RATE: 16 BRPM | SYSTOLIC BLOOD PRESSURE: 149 MMHG

## 2023-09-01 PROCEDURE — 6370000000 HC RX 637 (ALT 250 FOR IP): Performed by: NURSE PRACTITIONER

## 2023-09-01 RX ORDER — NAPROXEN 250 MG/1
500 TABLET ORAL
Status: COMPLETED | OUTPATIENT
Start: 2023-09-01 | End: 2023-09-01

## 2023-09-01 RX ORDER — NAPROXEN 500 MG/1
500 TABLET ORAL 2 TIMES DAILY PRN
Qty: 20 TABLET | Refills: 0 | Status: SHIPPED | OUTPATIENT
Start: 2023-09-01 | End: 2023-09-11

## 2023-09-01 RX ORDER — LIDOCAINE HYDROCHLORIDE 20 MG/ML
10 SOLUTION OROPHARYNGEAL EVERY 4 HOURS PRN
Qty: 100 ML | Refills: 0 | Status: SHIPPED | OUTPATIENT
Start: 2023-09-01 | End: 2023-09-06

## 2023-09-01 RX ADMIN — NAPROXEN 500 MG: 250 TABLET ORAL at 00:59

## 2023-09-01 ASSESSMENT — PAIN DESCRIPTION - LOCATION: LOCATION: JAW;TEETH

## 2023-09-01 ASSESSMENT — PAIN SCALES - GENERAL
PAINLEVEL_OUTOF10: 8
PAINLEVEL_OUTOF10: 8

## 2023-09-01 ASSESSMENT — PAIN DESCRIPTION - DESCRIPTORS: DESCRIPTORS: ACHING
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
## 2023-09-01 NOTE — DISCHARGE INSTRUCTIONS
Follow-up with one of the provided dental clinics below:    [address] [facility] -Extraction [phone] [facility] [phone], [phone]  [address] [place] [address] [phone]  [facility] ([address] [facility] [phone], [phone]      Call to schedule an appointment.

## 2023-10-07 ENCOUNTER — APPOINTMENT (OUTPATIENT)
Facility: HOSPITAL | Age: 33
End: 2023-10-07
Payer: COMMERCIAL

## 2023-10-07 ENCOUNTER — HOSPITAL ENCOUNTER (EMERGENCY)
Facility: HOSPITAL | Age: 33
Discharge: HOME | End: 2023-10-07
Payer: COMMERCIAL

## 2023-10-07 VITALS
SYSTOLIC BLOOD PRESSURE: 155 MMHG | BODY MASS INDEX: 31.75 KG/M2 | HEART RATE: 76 BPM | TEMPERATURE: 97.7 F | OXYGEN SATURATION: 98 % | DIASTOLIC BLOOD PRESSURE: 133 MMHG | RESPIRATION RATE: 20 BRPM | WEIGHT: 215 LBS

## 2023-10-07 DIAGNOSIS — M25.472 LEFT ANKLE SWELLING: Primary | ICD-10-CM

## 2023-10-07 PROCEDURE — 73610 X-RAY EXAM OF ANKLE: CPT

## 2023-10-07 PROCEDURE — 99283 EMERGENCY DEPT VISIT LOW MDM: CPT

## 2023-10-07 PROCEDURE — 73562 X-RAY EXAM OF KNEE 3: CPT

## 2023-10-07 RX ORDER — NAPROXEN 500 MG/1
500 TABLET ORAL 2 TIMES DAILY
Qty: 30 TABLET | Refills: 0 | Status: SHIPPED | OUTPATIENT
Start: 2023-10-07

## 2023-10-07 ASSESSMENT — ENCOUNTER SYMPTOMS
EYE DISCHARGE: 0
SHORTNESS OF BREATH: 0
DIARRHEA: 0
SORE THROAT: 0
COUGH: 0
NAUSEA: 0
ABDOMINAL PAIN: 0

## 2023-10-08 NOTE — ED PROVIDER NOTES
EMERGENCY DEPARTMENT HISTORY AND PHYSICAL EXAM    Date: 10/7/2023  Patient Name: Willie Sarabia    History of Presenting Illness     Chief Complaint   Patient presents with    Ankle Pain         History Provided By: Patient      Additional History (Context): Willie Sarabia is a 26 y.o. male who presents today for left ankle pain and swelling. Patient reports he noticed it after working a couple days ago. Has not tried thing for this at home. Denies any prior injuries or surgeries to this ankle. Denies known cause for his symptoms. PCP: None, None    No current facility-administered medications for this encounter. Current Outpatient Medications   Medication Sig Dispense Refill    naproxen (NAPROSYN) 500 MG tablet Take 1 tablet by mouth 2 times daily 30 tablet 0    naproxen (NAPROSYN) 500 MG tablet Take 1 tablet by mouth 2 times daily as needed for Pain 20 tablet 0    amLODIPine (NORVASC) 10 MG tablet Take 10 mg by mouth daily         Past History     Past Medical History:  Past Medical History:   Diagnosis Date    Hypertension        Past Surgical History:  No past surgical history on file. Family History:  No family history on file. Social History:  Social History     Tobacco Use    Smoking status: Every Day     Packs/day: 1     Types: Cigarettes    Smokeless tobacco: Never   Substance Use Topics    Alcohol use: Yes    Drug use: Yes     Types: Heroin, Opiates        Allergies:  No Known Allergies      Review of Systems   Review of Systems   Constitutional:  Negative for chills and fever. HENT:  Negative for congestion, sneezing and sore throat. Eyes:  Negative for discharge. Respiratory:  Negative for cough and shortness of breath. Cardiovascular:  Negative for chest pain. Gastrointestinal:  Negative for abdominal pain, diarrhea and nausea. Genitourinary:  Negative for dysuria, frequency and urgency. Musculoskeletal:  Positive for joint swelling. Negative for arthralgias.    Skin:

## 2023-10-08 NOTE — ED TRIAGE NOTES
Patient c/o left left ankle swelling x4 days first his left knee was painful then that went away left ankle is warm to touch, not tender upon palpation  Patient has taken motrin not effective to pain

## 2023-10-25 ENCOUNTER — HOSPITAL ENCOUNTER (EMERGENCY)
Facility: HOSPITAL | Age: 33
Discharge: HOME | End: 2023-10-25
Payer: COMMERCIAL

## 2023-10-25 VITALS
SYSTOLIC BLOOD PRESSURE: 157 MMHG | DIASTOLIC BLOOD PRESSURE: 96 MMHG | TEMPERATURE: 97.1 F | HEIGHT: 69 IN | BODY MASS INDEX: 31.1 KG/M2 | OXYGEN SATURATION: 98 % | HEART RATE: 80 BPM | WEIGHT: 210 LBS

## 2023-10-25 DIAGNOSIS — I10 ESSENTIAL HYPERTENSION: ICD-10-CM

## 2023-10-25 DIAGNOSIS — K04.7 DENTAL ABSCESS: Primary | ICD-10-CM

## 2023-10-25 PROCEDURE — 6370000000 HC RX 637 (ALT 250 FOR IP): Performed by: PHYSICIAN ASSISTANT

## 2023-10-25 PROCEDURE — 99283 EMERGENCY DEPT VISIT LOW MDM: CPT

## 2023-10-25 RX ORDER — AMLODIPINE BESYLATE 10 MG/1
10 TABLET ORAL
Status: COMPLETED | OUTPATIENT
Start: 2023-10-25 | End: 2023-10-25

## 2023-10-25 RX ORDER — PENICILLIN V POTASSIUM 500 MG/1
500 TABLET ORAL 4 TIMES DAILY
Qty: 40 TABLET | Refills: 0 | Status: SHIPPED | OUTPATIENT
Start: 2023-10-25 | End: 2023-11-04

## 2023-10-25 RX ORDER — PENICILLIN V POTASSIUM 250 MG/1
500 TABLET ORAL
Status: COMPLETED | OUTPATIENT
Start: 2023-10-25 | End: 2023-10-25

## 2023-10-25 RX ORDER — OXYCODONE HYDROCHLORIDE AND ACETAMINOPHEN 5; 325 MG/1; MG/1
1 TABLET ORAL
Status: COMPLETED | OUTPATIENT
Start: 2023-10-25 | End: 2023-10-25

## 2023-10-25 RX ORDER — AMLODIPINE BESYLATE 10 MG/1
10 TABLET ORAL DAILY
Qty: 30 TABLET | Refills: 0 | Status: SHIPPED | OUTPATIENT
Start: 2023-10-25

## 2023-10-25 RX ADMIN — PENICILLIN V POTASSIUM 500 MG: 250 TABLET, FILM COATED ORAL at 22:43

## 2023-10-25 RX ADMIN — AMLODIPINE BESYLATE 10 MG: 10 TABLET ORAL at 22:43

## 2023-10-25 RX ADMIN — OXYCODONE HYDROCHLORIDE AND ACETAMINOPHEN 1 TABLET: 5; 325 TABLET ORAL at 22:45

## 2023-10-26 NOTE — ED NOTES
Received patient in triage with c/o right sided dental pain x 1 week. Pt also requesting new prescription for his Norvasc.       Britton Velasquez RN  10/25/23 280[address] [facility]  10/25/23 8828

## 2023-11-29 ENCOUNTER — HOSPITAL ENCOUNTER (EMERGENCY)
Facility: HOSPITAL | Age: 33
Discharge: HOME | End: 2023-11-29
Attending: STUDENT IN AN ORGANIZED HEALTH CARE EDUCATION/TRAINING PROGRAM
Payer: COMMERCIAL

## 2023-11-29 VITALS
OXYGEN SATURATION: 95 % | HEART RATE: 73 BPM | DIASTOLIC BLOOD PRESSURE: 115 MMHG | RESPIRATION RATE: 18 BRPM | TEMPERATURE: 98.1 F | SYSTOLIC BLOOD PRESSURE: 170 MMHG

## 2023-11-29 DIAGNOSIS — T40.1X1A ACCIDENTAL OVERDOSE OF HEROIN, INITIAL ENCOUNTER (HCC): Primary | ICD-10-CM

## 2023-11-29 DIAGNOSIS — Z53.21 ELOPED FROM EMERGENCY DEPARTMENT: ICD-10-CM

## 2023-11-29 PROCEDURE — 99283 EMERGENCY DEPT VISIT LOW MDM: CPT

## 2023-11-29 ASSESSMENT — ENCOUNTER SYMPTOMS
VOMITING: 0
SORE THROAT: 0
DIARRHEA: 0
SHORTNESS OF BREATH: 0
ABDOMINAL PAIN: 0
NAUSEA: 0
CHEST TIGHTNESS: 0

## 2023-11-30 NOTE — ED NOTES
Pt requesting sock and for us to call his mom, he wants to leave. Pt given socks and advised he can use the phone in the lobby. Pt put on socks and walked out to lobby.       Ruben Radford RN  11/29/23 2010

## 2023-11-30 NOTE — ED PROVIDER NOTES
EMERGENCY DEPARTMENT HISTORY AND PHYSICAL EXAM      Date: 11/29/2023  Patient Name: Gallito Gabriel    History of Presenting Illness     Chief Complaint   Patient presents with    Drug Overdose       15-year-old male presenting to the emergency department for opioid overdose. Patient states that he took 3 caps left fentanyl and heroin. he self administered Narcan and then called 911. PCP: None, None    No current facility-administered medications for this encounter. Current Outpatient Medications   Medication Sig Dispense Refill    clindamycin (CLEOCIN) 300 MG capsule Take 1 capsule by mouth 3 times daily for 7 days 21 capsule 0    ibuprofen (ADVIL;MOTRIN) 600 MG tablet Take 1 tablet by mouth every 8 hours as needed for Pain 15 tablet 0    amLODIPine (NORVASC) 10 MG tablet Take 1 tablet by mouth daily 30 tablet 0    naproxen (NAPROSYN) 500 MG tablet Take 1 tablet by mouth 2 times daily 30 tablet 0    amLODIPine (NORVASC) 10 MG tablet Take 10 mg by mouth daily         Past History     Past Medical History:  Past Medical History:   Diagnosis Date    Hypertension        Past Surgical History:  No past surgical history on file. Family History:  No family history on file. Social History:  Social History     Tobacco Use    Smoking status: Every Day     Packs/day: 1     Types: Cigarettes    Smokeless tobacco: Never   Substance Use Topics    Alcohol use: Yes    Drug use: Yes     Types: Heroin, Opiates        Allergies:  No Known Allergies      Review of Systems     Review of Systems   Constitutional:  Negative for activity change, appetite change, fatigue and fever. HENT:  Negative for congestion and sore throat. Respiratory:  Negative for chest tightness and shortness of breath. Cardiovascular:  Negative for chest pain. Gastrointestinal:  Negative for abdominal pain, diarrhea, nausea and vomiting. Genitourinary:  Negative for difficulty urinating, dysuria, flank pain, hematuria and urgency.

## 2023-11-30 NOTE — ED TRIAGE NOTES
Pt arrives via ems after accidentally overdosing on heroin. Pt states he took 3 caps and than followed it with his narcan. Pt comes in AOx4. Pt ambulatory to bathroom and back to bed. Pt appears warm, dry.  Calm, and not in distress at this time

## 2023-12-06 ENCOUNTER — HOSPITAL ENCOUNTER (EMERGENCY)
Facility: HOSPITAL | Age: 33
Discharge: HOME | End: 2023-12-06
Attending: EMERGENCY MEDICINE
Payer: COMMERCIAL

## 2023-12-06 VITALS
TEMPERATURE: 98.3 F | BODY MASS INDEX: 30.36 KG/M2 | HEIGHT: 69 IN | SYSTOLIC BLOOD PRESSURE: 165 MMHG | DIASTOLIC BLOOD PRESSURE: 117 MMHG | RESPIRATION RATE: 18 BRPM | OXYGEN SATURATION: 100 % | HEART RATE: 87 BPM | WEIGHT: 205 LBS

## 2023-12-06 DIAGNOSIS — I10 POORLY-CONTROLLED HYPERTENSION: ICD-10-CM

## 2023-12-06 DIAGNOSIS — K02.9 PAIN DUE TO DENTAL CARIES: Primary | ICD-10-CM

## 2023-12-06 PROCEDURE — 99283 EMERGENCY DEPT VISIT LOW MDM: CPT

## 2023-12-06 PROCEDURE — 6370000000 HC RX 637 (ALT 250 FOR IP): Performed by: EMERGENCY MEDICINE

## 2023-12-06 RX ORDER — IBUPROFEN 600 MG/1
600 TABLET ORAL
Status: COMPLETED | OUTPATIENT
Start: 2023-12-06 | End: 2023-12-06

## 2023-12-06 RX ORDER — LIDOCAINE HYDROCHLORIDE 20 MG/ML
10 SOLUTION OROPHARYNGEAL
Qty: 200 ML | Refills: 0 | Status: SHIPPED | OUTPATIENT
Start: 2023-12-06

## 2023-12-06 RX ORDER — LIDOCAINE HYDROCHLORIDE 20 MG/ML
15 SOLUTION OROPHARYNGEAL
Status: COMPLETED | OUTPATIENT
Start: 2023-12-06 | End: 2023-12-06

## 2023-12-06 RX ADMIN — IBUPROFEN 600 MG: 600 TABLET, FILM COATED ORAL at 04:25

## 2023-12-06 RX ADMIN — LIDOCAINE HYDROCHLORIDE 15 ML: 20 SOLUTION ORAL; TOPICAL at 04:25

## 2023-12-06 ASSESSMENT — PAIN DESCRIPTION - LOCATION
LOCATION: TEETH
LOCATION: JAW;TEETH

## 2023-12-06 ASSESSMENT — PAIN - FUNCTIONAL ASSESSMENT: PAIN_FUNCTIONAL_ASSESSMENT: 0-10

## 2023-12-06 ASSESSMENT — PAIN DESCRIPTION - ORIENTATION: ORIENTATION: RIGHT

## 2023-12-06 ASSESSMENT — PAIN SCALES - GENERAL: PAINLEVEL_OUTOF10: 10

## 2023-12-06 NOTE — ED TRIAGE NOTES
Patient comes in with complaints of excruciating tooth pain. Patient recently given antibiotics for this, and has been on it for 4 days, and says the medication is not working. Pain is 10/10.

## 2023-12-06 NOTE — DISCHARGE INSTRUCTIONS
Continue the clindamycin prescribed. You may use ibuprofen or Tylenol for your discomfort. Continue to use the viscous lidocaine as needed for your pain. Please arrange follow-up with your dentist as soon as possible. You also need to see your primary care doctor this week regarding your elevated blood pressure.   Return as needed

## 2023-12-06 NOTE — ED PROVIDER NOTES
(ADVIL;MOTRIN) tablet 600 mg (has no administration in time range)            The patient tolerated their visit well. Thepatient and / or the family were informed of the results of any tests, a time was given to answer questions. I am the Primary Clinician of Record. FINAL IMPRESSION    No diagnosis found. DISPOSITION/PLAN   DISPOSITION        PATIENT REFERRED TO:  No follow-up provider specified.     DISCHARGE MEDICATIONS:  New Prescriptions    No medications on file       DISCONTINUED MEDICATIONS:  Discontinued Medications    No medications on file              (Please note that portions of this note were completed with a voice recognition program.  Efforts were made to edit the dictations but occasionally words aremis-transcribed.)    Etelvina Wilde MD (electronically signed)          Etelvina Wilde MD  12/07/23 [id number]

## 2024-04-10 ENCOUNTER — HOSPITAL ENCOUNTER (EMERGENCY)
Facility: HOSPITAL | Age: 34
Discharge: HOME | End: 2024-04-10

## 2024-04-10 VITALS
BODY MASS INDEX: 32.58 KG/M2 | RESPIRATION RATE: 17 BRPM | DIASTOLIC BLOOD PRESSURE: 92 MMHG | WEIGHT: 220 LBS | TEMPERATURE: 98.4 F | SYSTOLIC BLOOD PRESSURE: 153 MMHG | HEIGHT: 69 IN | OXYGEN SATURATION: 97 % | HEART RATE: 80 BPM

## 2024-04-10 DIAGNOSIS — J01.80 ACUTE NON-RECURRENT SINUSITIS OF OTHER SINUS: Primary | ICD-10-CM

## 2024-04-10 LAB
FLUAV AG NPH QL IA: NEGATIVE
FLUBV AG NOSE QL IA: NEGATIVE
SARS-COV-2 RDRP RESP QL NAA+PROBE: NOT DETECTED
SOURCE: NORMAL

## 2024-04-10 PROCEDURE — 6370000000 HC RX 637 (ALT 250 FOR IP): Performed by: PHYSICIAN ASSISTANT

## 2024-04-10 PROCEDURE — 87635 SARS-COV-2 COVID-19 AMP PRB: CPT

## 2024-04-10 PROCEDURE — 99283 EMERGENCY DEPT VISIT LOW MDM: CPT

## 2024-04-10 PROCEDURE — 87804 INFLUENZA ASSAY W/OPTIC: CPT

## 2024-04-10 RX ORDER — FLUTICASONE PROPIONATE 50 MCG
1 SPRAY, SUSPENSION (ML) NASAL DAILY
Qty: 32 G | Refills: 1 | Status: SHIPPED | OUTPATIENT
Start: 2024-04-10

## 2024-04-10 RX ORDER — AMOXICILLIN AND CLAVULANATE POTASSIUM 875; 125 MG/1; MG/1
1 TABLET, FILM COATED ORAL
Status: COMPLETED | OUTPATIENT
Start: 2024-04-10 | End: 2024-04-10

## 2024-04-10 RX ORDER — PREDNISONE 20 MG/1
60 TABLET ORAL
Status: COMPLETED | OUTPATIENT
Start: 2024-04-10 | End: 2024-04-10

## 2024-04-10 RX ORDER — METHYLPREDNISOLONE 4 MG/1
TABLET ORAL
Qty: 21 TABLET | Refills: 0 | Status: SHIPPED | OUTPATIENT
Start: 2024-04-10

## 2024-04-10 RX ORDER — AMOXICILLIN AND CLAVULANATE POTASSIUM 875; 125 MG/1; MG/1
1 TABLET, FILM COATED ORAL 2 TIMES DAILY
Qty: 20 TABLET | Refills: 0 | Status: SHIPPED | OUTPATIENT
Start: 2024-04-10 | End: 2024-04-20

## 2024-04-10 RX ORDER — LORATADINE 10 MG/1
10 TABLET ORAL DAILY
Qty: 30 TABLET | Refills: 0 | Status: SHIPPED | OUTPATIENT
Start: 2024-04-10 | End: 2024-05-10

## 2024-04-10 RX ADMIN — AMOXICILLIN AND CLAVULANATE POTASSIUM 1 TABLET: 875; 125 TABLET, FILM COATED ORAL at 20:03

## 2024-04-10 RX ADMIN — PREDNISONE 60 MG: 20 TABLET ORAL at 20:03

## 2024-04-10 ASSESSMENT — LIFESTYLE VARIABLES
HOW MANY STANDARD DRINKS CONTAINING ALCOHOL DO YOU HAVE ON A TYPICAL DAY: PATIENT DOES NOT DRINK
HOW OFTEN DO YOU HAVE A DRINK CONTAINING ALCOHOL: NEVER

## 2024-04-10 ASSESSMENT — PAIN - FUNCTIONAL ASSESSMENT: PAIN_FUNCTIONAL_ASSESSMENT: NONE - DENIES PAIN

## 2024-04-10 NOTE — ED TRIAGE NOTES
Ambulatory pt c/o sinus congestion / infection x 1 month. States \"I am tired of not being cameron to breathe at night\"

## 2024-04-11 NOTE — ED PROVIDER NOTES
EMERGENCY DEPARTMENT HISTORY AND PHYSICAL EXAM    Date: 4/10/2024  Patient Name: Darell Gonzalez    History of Presenting Illness     Chief Complaint:   Chief Complaint   Patient presents with    Sinusitis     History Provided By: Patient    Additional History (Context): Darell Gonzalez is a 34 y.o. male complaining of persistent nasal congestion x 1 month, was using OTC antihistamine azelastine nasal spray without relief.  Denies fevers, chills, ear pain, sore throat, cough.  Doubts sick contacts but cannot rule them out either.    PCP: None, None    No current facility-administered medications for this encounter.     Current Outpatient Medications   Medication Sig Dispense Refill    amoxicillin-clavulanate (AUGMENTIN) 875-125 MG per tablet Take 1 tablet by mouth 2 times daily for 10 days 20 tablet 0    methylPREDNISolone (MEDROL, WANDY,) 4 MG tablet Take by mouth. 21 tablet 0    fluticasone (FLONASE) 50 MCG/ACT nasal spray 1 spray by Each Nostril route daily 32 g 1    loratadine (CLARITIN) 10 MG tablet Take 1 tablet by mouth daily 30 tablet 0    lidocaine viscous hcl (XYLOCAINE) 2 % SOLN solution Take 10 mLs by mouth every 3 hours as needed for Dental Pain 200 mL 0    ibuprofen (ADVIL;MOTRIN) 600 MG tablet Take 1 tablet by mouth every 8 hours as needed for Pain 15 tablet 0    amLODIPine (NORVASC) 10 MG tablet Take 1 tablet by mouth daily 30 tablet 0    naproxen (NAPROSYN) 500 MG tablet Take 1 tablet by mouth 2 times daily 30 tablet 0    amLODIPine (NORVASC) 10 MG tablet Take 10 mg by mouth daily         Past History     Past Medical History:  Past Medical History:   Diagnosis Date    Hypertension        Past Surgical History:  History reviewed. No pertinent surgical history.    Family History:  History reviewed. No pertinent family history.    Social History:  Social History     Tobacco Use    Smoking status: Every Day     Current packs/day: 1.00     Types: Cigarettes    Smokeless tobacco: Never   Substance Use

## 2024-05-12 ENCOUNTER — HOSPITAL ENCOUNTER (EMERGENCY)
Facility: HOSPITAL | Age: 34
Discharge: HOME | End: 2024-05-12
Attending: EMERGENCY MEDICINE

## 2024-05-12 ENCOUNTER — APPOINTMENT (OUTPATIENT)
Facility: HOSPITAL | Age: 34
End: 2024-05-12

## 2024-05-12 VITALS
BODY MASS INDEX: 32.58 KG/M2 | TEMPERATURE: 97 F | HEIGHT: 69 IN | WEIGHT: 220 LBS | RESPIRATION RATE: 16 BRPM | SYSTOLIC BLOOD PRESSURE: 139 MMHG | OXYGEN SATURATION: 96 % | DIASTOLIC BLOOD PRESSURE: 87 MMHG | HEART RATE: 83 BPM

## 2024-05-12 DIAGNOSIS — M25.562 ACUTE PAIN OF LEFT KNEE: Primary | ICD-10-CM

## 2024-05-12 PROCEDURE — 73562 X-RAY EXAM OF KNEE 3: CPT

## 2024-05-12 PROCEDURE — 6370000000 HC RX 637 (ALT 250 FOR IP): Performed by: EMERGENCY MEDICINE

## 2024-05-12 PROCEDURE — 99283 EMERGENCY DEPT VISIT LOW MDM: CPT

## 2024-05-12 RX ORDER — ACETAMINOPHEN 500 MG
1000 TABLET ORAL
Status: COMPLETED | OUTPATIENT
Start: 2024-05-12 | End: 2024-05-12

## 2024-05-12 RX ORDER — IBUPROFEN 800 MG/1
800 TABLET ORAL 2 TIMES DAILY PRN
Qty: 30 TABLET | Refills: 0 | Status: SHIPPED | OUTPATIENT
Start: 2024-05-12

## 2024-05-12 RX ADMIN — ACETAMINOPHEN 1000 MG: 500 TABLET ORAL at 18:33

## 2024-05-12 ASSESSMENT — PAIN DESCRIPTION - ORIENTATION: ORIENTATION: LEFT

## 2024-05-12 ASSESSMENT — PAIN SCALES - GENERAL: PAINLEVEL_OUTOF10: 9

## 2024-05-12 ASSESSMENT — PAIN DESCRIPTION - LOCATION: LOCATION: KNEE

## 2024-05-12 ASSESSMENT — PAIN - FUNCTIONAL ASSESSMENT: PAIN_FUNCTIONAL_ASSESSMENT: 0-10

## 2024-05-12 NOTE — ED PROVIDER NOTES
EMERGENCY DEPARTMENT HISTORY AND PHYSICAL EXAM      Patient Name: Darell Gonzalez  MRN: [id number]  YOB: 1990  Provider: Swetha Nieto PA-C  PCP: None, None   Time/Date of evaluation: 6:30 PM EDT on 5/12/24    History of Presenting Illness     Chief Complaint   Patient presents with   • Knee Pain       History Provided By: Patient     History (Narative):   Darell Gonzalez is a 34 y.o. male with a PMHX of HTN   who presents to the emergency department  by POV C/O nontraumatic knee pain for the past 2 days.  Patient states that he has been taking Motrin with relief of symptoms.  He denies any contact sports, any falls, injuries, IV drug use, fever, chills recent instrumentation of knee.      Past History     Past Medical History:  Past Medical History:   Diagnosis Date   • Hypertension        Past Surgical History:  No past surgical history on file.    Family History:  No family history on file.    Social History:  Social History     Tobacco Use   • Smoking status: Every Day     Current packs/day: 1.00     Types: Cigarettes   • Smokeless tobacco: Never   Substance Use Topics   • Alcohol use: Yes   • Drug use: Yes     Types: Heroin, Opiates        Medications:  No current facility-administered medications for this encounter.     Current Outpatient Medications   Medication Sig Dispense Refill   • ibuprofen (ADVIL;MOTRIN) 800 MG tablet Take 1 tablet by mouth 2 times daily as needed for Pain 30 tablet 0   • methylPREDNISolone (MEDROL, WANDY,) 4 MG tablet Take by mouth. 21 tablet 0   • fluticasone (FLONASE) 50 MCG/ACT nasal spray 1 spray by Each Nostril route daily 32 g 1   • lidocaine viscous hcl (XYLOCAINE) 2 % SOLN solution Take 10 mLs by mouth every 3 hours as needed for Dental Pain 200 mL 0   • amLODIPine (NORVASC) 10 MG tablet Take 1 tablet by mouth daily 30 tablet 0   • naproxen (NAPROSYN) 500 MG tablet Take 1 tablet by mouth 2 times daily 30 tablet 0   • amLODIPine (NORVASC) 10 MG tablet Take 10 mg by

## 2024-05-12 NOTE — ED PROVIDER NOTES
[facility] EMERGENCY DEPT  eMERGENCY dEPARTMENT eNCOUnter        Pt Name: Darell Gonzalez  MRN: [id number]  Birthdate 1990  Date of evaluation: 5/12/2024  Provider: Tan Pope MD  PCP: None, None  Note Started: 6:00 PM EDT 5/12/2024          CHIEF COMPLAINT       Chief Complaint   Patient presents with    Knee Pain       HISTORY OF PRESENT ILLNESS        Patient presenting with 2 to 3 days of left knee pain and swelling.  No fever redness or warmth.  There is been no trauma.  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  No history of gout.    Nursing Notes were all reviewed and agreed with or any disagreements were addressed  in the HPI.    REVIEW OF SYSTEMS               PASTMEDICAL HISTORY     Past Medical History:   Diagnosis Date    Hypertension          SURGICAL HISTORY     No past surgical history on file.      CURRENT MEDICATIONS       Discharge Medication List as of 5/12/2024  6:42 PM        CONTINUE these medications which have NOT CHANGED    Details   methylPREDNISolone (MEDROL, WANDY,) 4 MG tablet Take by mouth., Disp-21 tablet, R-0Normal      fluticasone (FLONASE) 50 MCG/ACT nasal spray 1 spray by Each Nostril route daily, Disp-32 g, R-1Normal      lidocaine viscous hcl (XYLOCAINE) 2 % SOLN solution Take 10 mLs by mouth every 3 hours as needed for Dental Pain, Disp-200 mL, R-0Normal      !! amLODIPine (NORVASC) 10 MG tablet Take 1 tablet by mouth daily, Disp-30 tablet, R-0Normal      naproxen (NAPROSYN) 500 MG tablet Take 1 tablet by mouth 2 times daily, Disp-30 tablet, R-0Normal      !! amLODIPine (NORVASC) 10 MG tablet Take 10 mg by mouth dailyHistorical Med       !! - Potential duplicate medications found. Please discuss with provider.          ALLERGIES     Patient has no known allergies.    FAMILY HISTORY     No family history on file.       SOCIAL HISTORY       Social History     Socioeconomic History    Marital status: Single   Tobacco Use    Smoking status: Every Day

## 2024-12-20 ENCOUNTER — HOSPITAL ENCOUNTER (EMERGENCY)
Facility: HOSPITAL | Age: 34
Discharge: HOME | End: 2024-12-20
Attending: EMERGENCY MEDICINE
Payer: COMMERCIAL

## 2024-12-20 VITALS
DIASTOLIC BLOOD PRESSURE: 115 MMHG | BODY MASS INDEX: 35.55 KG/M2 | SYSTOLIC BLOOD PRESSURE: 176 MMHG | RESPIRATION RATE: 18 BRPM | WEIGHT: 240 LBS | HEART RATE: 65 BPM | TEMPERATURE: 98.5 F | OXYGEN SATURATION: 95 % | HEIGHT: 69 IN

## 2024-12-20 DIAGNOSIS — K08.89 PAIN, DENTAL: Primary | ICD-10-CM

## 2024-12-20 PROCEDURE — 99283 EMERGENCY DEPT VISIT LOW MDM: CPT

## 2024-12-20 PROCEDURE — 6370000000 HC RX 637 (ALT 250 FOR IP): Performed by: EMERGENCY MEDICINE

## 2024-12-20 RX ORDER — OXYCODONE AND ACETAMINOPHEN 5; 325 MG/1; MG/1
1 TABLET ORAL EVERY 6 HOURS PRN
Qty: 12 TABLET | Refills: 0 | Status: SHIPPED | OUTPATIENT
Start: 2024-12-20 | End: 2024-12-23

## 2024-12-20 RX ORDER — OXYCODONE AND ACETAMINOPHEN 5; 325 MG/1; MG/1
1 TABLET ORAL
Status: COMPLETED | OUTPATIENT
Start: 2024-12-20 | End: 2024-12-20

## 2024-12-20 RX ORDER — AMOXICILLIN 500 MG/1
500 CAPSULE ORAL 3 TIMES DAILY
Qty: 30 CAPSULE | Refills: 0 | Status: SHIPPED | OUTPATIENT
Start: 2024-12-20 | End: 2024-12-30

## 2024-12-20 RX ORDER — AMOXICILLIN 250 MG/1
500 CAPSULE ORAL
Status: COMPLETED | OUTPATIENT
Start: 2024-12-20 | End: 2024-12-20

## 2024-12-20 RX ADMIN — AMOXICILLIN 500 MG: 250 CAPSULE ORAL at 00:46

## 2024-12-20 RX ADMIN — OXYCODONE HYDROCHLORIDE AND ACETAMINOPHEN 1 TABLET: 5; 325 TABLET ORAL at 00:46

## 2024-12-20 ASSESSMENT — PAIN - FUNCTIONAL ASSESSMENT: PAIN_FUNCTIONAL_ASSESSMENT: 0-10

## 2024-12-20 ASSESSMENT — LIFESTYLE VARIABLES
HOW OFTEN DO YOU HAVE A DRINK CONTAINING ALCOHOL: NEVER
HOW MANY STANDARD DRINKS CONTAINING ALCOHOL DO YOU HAVE ON A TYPICAL DAY: PATIENT DOES NOT DRINK

## 2024-12-20 ASSESSMENT — ENCOUNTER SYMPTOMS: RESPIRATORY NEGATIVE: 1

## 2024-12-20 ASSESSMENT — PAIN SCALES - GENERAL: PAINLEVEL_OUTOF10: 10

## 2024-12-20 NOTE — ED PROVIDER NOTES
`[facility] EMERGENCY DEPT  eMERGENCY dEPARTMENT eNCOUnter      Pt Name: Darell Gonzalez  MRN: [id number]  Birthdate 1990 of evaluation: 12/20/2024  Provider:Lenin Garg MD    CHIEF COMPLAINT       HPI    Darell Gonzalez is a 34 y.o. male  c/o having a severe toothache    ROS    Review of Systems   HENT:  Positive for dental problem.    Respiratory: Negative.     Cardiovascular: Negative.    All other systems reviewed and are negative.      Except as noted above the remainder of the review of systems was reviewed and negative.       PAST MEDICAL HISTORY     Past Medical History:   Diagnosis Date    Hypertension        SURGICAL HISTORY     History reviewed. No pertinent surgical history.      CURRENTMEDICATIONS       Previous Medications    AMLODIPINE (NORVASC) 10 MG TABLET    Take 10 mg by mouth daily    AMLODIPINE (NORVASC) 10 MG TABLET    Take 1 tablet by mouth daily    FLUTICASONE (FLONASE) 50 MCG/ACT NASAL SPRAY    1 spray by Each Nostril route daily    IBUPROFEN (ADVIL;MOTRIN) 800 MG TABLET    Take 1 tablet by mouth 2 times daily as needed for Pain    LIDOCAINE VISCOUS HCL (XYLOCAINE) 2 % SOLN SOLUTION    Take 10 mLs by mouth every 3 hours as needed for Dental Pain    METHYLPREDNISOLONE (MEDROL, WANDY,) 4 MG TABLET    Take by mouth.    NAPROXEN (NAPROSYN) 500 MG TABLET    Take 1 tablet by mouth 2 times daily       ALLERGIES     Patient has no known allergies.    FAMILY HISTORY     History reviewed. No pertinent family history.       SOCIAL HISTORY       Social History     Socioeconomic History    Marital status: Single     Spouse name: None    Number of children: None    Years of education: None    Highest education level: None   Tobacco Use    Smoking status: Every Day     Current packs/day: 1.00     Types: Cigarettes    Smokeless tobacco: Never   Substance and Sexual Activity    Alcohol use: Yes    Drug use: Yes     Types: Heroin, Opiates          PHYSICAL EXAM       ED Triage Vitals [12/20/24 0023]   BP

## 2025-04-29 ENCOUNTER — HOSPITAL ENCOUNTER (EMERGENCY)
Facility: HOSPITAL | Age: 35
Discharge: HOME | End: 2025-04-29

## 2025-04-29 VITALS
HEIGHT: 69 IN | BODY MASS INDEX: 35.55 KG/M2 | OXYGEN SATURATION: 97 % | DIASTOLIC BLOOD PRESSURE: 109 MMHG | HEART RATE: 84 BPM | TEMPERATURE: 98.4 F | SYSTOLIC BLOOD PRESSURE: 174 MMHG | WEIGHT: 240 LBS | RESPIRATION RATE: 20 BRPM

## 2025-04-29 DIAGNOSIS — I10 ESSENTIAL HYPERTENSION: ICD-10-CM

## 2025-04-29 DIAGNOSIS — K08.89 PAIN, DENTAL: Primary | ICD-10-CM

## 2025-04-29 PROCEDURE — 6370000000 HC RX 637 (ALT 250 FOR IP)

## 2025-04-29 PROCEDURE — 6360000002 HC RX W HCPCS

## 2025-04-29 PROCEDURE — 99284 EMERGENCY DEPT VISIT MOD MDM: CPT

## 2025-04-29 PROCEDURE — 96372 THER/PROPH/DIAG INJ SC/IM: CPT

## 2025-04-29 RX ORDER — CLINDAMYCIN HYDROCHLORIDE 150 MG/1
300 CAPSULE ORAL
Status: COMPLETED | OUTPATIENT
Start: 2025-04-29 | End: 2025-04-29

## 2025-04-29 RX ORDER — KETOROLAC TROMETHAMINE 15 MG/ML
15 INJECTION, SOLUTION INTRAMUSCULAR; INTRAVENOUS
Status: COMPLETED | OUTPATIENT
Start: 2025-04-29 | End: 2025-04-29

## 2025-04-29 RX ORDER — IBUPROFEN 800 MG/1
800 TABLET, FILM COATED ORAL 2 TIMES DAILY PRN
Qty: 60 TABLET | Refills: 0 | Status: SHIPPED | OUTPATIENT
Start: 2025-04-29

## 2025-04-29 RX ORDER — CLINDAMYCIN HYDROCHLORIDE 300 MG/1
300 CAPSULE ORAL 3 TIMES DAILY
Qty: 21 CAPSULE | Refills: 0 | Status: SHIPPED | OUTPATIENT
Start: 2025-04-29 | End: 2025-05-06

## 2025-04-29 RX ORDER — LIDOCAINE HYDROCHLORIDE 20 MG/ML
15 SOLUTION OROPHARYNGEAL
Qty: 100 ML | Refills: 0 | Status: SHIPPED | OUTPATIENT
Start: 2025-04-29 | End: 2025-05-09

## 2025-04-29 RX ADMIN — CLINDAMYCIN HYDROCHLORIDE 300 MG: 150 CAPSULE ORAL at 20:30

## 2025-04-29 RX ADMIN — KETOROLAC TROMETHAMINE 15 MG: 15 INJECTION, SOLUTION INTRAMUSCULAR; INTRAVENOUS at 20:26

## 2025-04-29 ASSESSMENT — PAIN - FUNCTIONAL ASSESSMENT: PAIN_FUNCTIONAL_ASSESSMENT: 0-10

## 2025-04-29 ASSESSMENT — PAIN SCALES - GENERAL: PAINLEVEL_OUTOF10: 9

## 2025-04-30 NOTE — ED PROVIDER NOTES
EMERGENCY DEPARTMENT HISTORY AND PHYSICAL EXAM      Patient Name: Darell Gonzalez  MRN: [id number]  YOB: 1990  Provider: Swetha Lopez PA-C  PCP: None, None   Time/Date of evaluation: 8:29 PM EDT on 4/29/25    History of Presenting Illness     Chief Complaint   Patient presents with    Dental Pain       History Provided By: Patient     History (Narative):   Darell Gonzalez is a 35 y.o. male with a PMHX of HTN   who presents to the emergency department  by POV C/O   of dental pain.  Patient endorses for the past couple of days he has been having left-sided dental pain.  He states that it has been over a year since he has received routine dental care.  However,  has been told in the past that he needed his left upper molar removed.  He denies any fever, chills, dysphagia, drooling.  Past History     Past Medical History:  Past Medical History:   Diagnosis Date    Hypertension        Past Surgical History:  No past surgical history on file.    Family History:  No family history on file.    Social History:  Social History     Tobacco Use    Smoking status: Every Day     Current packs/day: 1.00     Types: Cigarettes    Smokeless tobacco: Never   Substance Use Topics    Alcohol use: Yes    Drug use: Yes     Types: Heroin, Opiates        Medications:  Current Facility-Administered Medications   Medication Dose Route Frequency Provider Last Rate Last Admin    clindamycin (CLEOCIN) capsule 300 mg  300 mg Oral NOW Swetha Lopez PA-C         Current Outpatient Medications   Medication Sig Dispense Refill    clindamycin (CLEOCIN) 300 MG capsule Take 1 capsule by mouth 3 times daily for 7 days 21 capsule 0    ibuprofen (ADVIL;MOTRIN) 800 MG tablet Take 1 tablet by mouth 2 times daily as needed for Pain 60 tablet 0    lidocaine viscous hcl (XYLOCAINE) 2 % SOLN solution Take 15 mLs by mouth every 3 hours as needed for Irritation 100 mL 0    methylPREDNISolone (MEDROL, WANDY,) 4 MG tablet Take by mouth. 21

## 2025-04-30 NOTE — DISCHARGE INSTRUCTIONS
Follow-up with one of the provided dental clinics below:    [facility] [phone]  [facility] [phone]  [facility] [phone]  [facility] [phone]  [facility] [phone]  [facility] [phone]    Call to schedule an appointment.
